# Patient Record
Sex: MALE | Race: ASIAN | NOT HISPANIC OR LATINO | Employment: FULL TIME | URBAN - METROPOLITAN AREA
[De-identification: names, ages, dates, MRNs, and addresses within clinical notes are randomized per-mention and may not be internally consistent; named-entity substitution may affect disease eponyms.]

---

## 2017-06-21 ENCOUNTER — OFFICE VISIT (OUTPATIENT)
Dept: ORTHOPEDIC SURGERY | Facility: CLINIC | Age: 47
End: 2017-06-21

## 2017-06-21 VITALS — BODY MASS INDEX: 33.01 KG/M2 | TEMPERATURE: 98.6 F | WEIGHT: 217.8 LBS | HEIGHT: 68 IN

## 2017-06-21 DIAGNOSIS — S80.02XA CONTUSION OF LEFT KNEE, INITIAL ENCOUNTER: ICD-10-CM

## 2017-06-21 DIAGNOSIS — S59.902A ELBOW INJURY, LEFT, INITIAL ENCOUNTER: Primary | ICD-10-CM

## 2017-06-21 PROCEDURE — 73070 X-RAY EXAM OF ELBOW: CPT | Performed by: ORTHOPAEDIC SURGERY

## 2017-06-21 PROCEDURE — 99204 OFFICE O/P NEW MOD 45 MIN: CPT | Performed by: ORTHOPAEDIC SURGERY

## 2017-06-21 RX ORDER — CEFAZOLIN SODIUM 2 G/100ML
2 INJECTION, SOLUTION INTRAVENOUS ONCE
Status: CANCELLED | OUTPATIENT
Start: 2017-06-21 | End: 2017-06-21

## 2017-06-21 RX ORDER — SODIUM CHLORIDE 0.9 % (FLUSH) 0.9 %
1-10 SYRINGE (ML) INJECTION AS NEEDED
Status: CANCELLED | OUTPATIENT
Start: 2017-06-21

## 2017-06-21 RX ORDER — OXYCODONE HYDROCHLORIDE AND ACETAMINOPHEN 5; 325 MG/1; MG/1
TABLET ORAL
Refills: 0 | COMMUNITY
Start: 2017-06-18 | End: 2017-06-23 | Stop reason: SDUPTHER

## 2017-06-21 NOTE — PROGRESS NOTES
"  Patient: Roni Villaseñor    YOB: 1970    Medical Record Number: [id number]    Chief Complaints:  Left elbow and knee injuries.    History of Present Illness:     47 y.o. male patient who presents for evaluation of both his left elbow and knee.  He was in [place] attending a  when he sustained his injury on .  He apparently jumped 6 feet [employer] elevator and slipped, landing awkwardly on his left side.  He struck the anterior aspect of his left knee and landed awkwardly on his outstretched left upper extremity.  He noticed immediate pain and deformity of the elbow.  He was able to walk but he did have some bruising and swelling over the front of the left knee as well.  He was seen in an emergency room in [place] and underwent a reduction of an reported elbow fracture dislocation.  He tells me that the elbow \"still feels loose\".  He describes mild to moderate constant pain in the elbow, primarily over the lateral side.  Denies any pain in the wrist or shoulder.  He tells me that he is able to move everything normally in his hand.  He describes the knee pain as mild, constant, and aching.  He has been able to walk without difficulty.  His biggest complaint with regards to the knee is the swelling.  Denies any other injuries or complaints.  He has active and independent.  He works in IT.  He is left-hand dominant.    Allergies: No Known Allergies    Home Medications:      Current Outpatient Prescriptions:   •  oxyCODONE-acetaminophen (PERCOCET) 5-325 MG per tablet, TK 1 T PO Q 6 H PRN P, Disp: , Rfl: 0    No past medical history on file.    No past surgical history on file.    Social History     Occupational History   • Not on file.     Social History Main Topics   • Smoking status: Former Smoker   • Smokeless tobacco: Not on file   • Alcohol use Defer   • Drug use: Not on file   • Sexual activity: Not on file      Social History     Social History Narrative   • No narrative on file       No " "family history on file.    Review of Systems:      Constitutional: Denies fever, shaking or chills   Eyes: Denies change in visual acuity   HEENT: Denies nasal congestion or sore throat   Respiratory: Denies cough or shortness of breath   Cardiovascular: Denies chest pain or edema  Endocrine: Denies tremors, palpitations, intolerance of heat or cold, polyuria, polydipsia.  GI: Denies abdominal pain, nausea, vomiting, bloody stools or diarrhea  : Denies frequency, urgency, incontinence, retention, or nocturia.  Musculoskeletal: Denies numbness tingling or loss of motor function except as above  Integument: Denies rash, lesion or ulceration   Neurologic: Denies headache or focal weakness, deficits  Heme: Denies epistaxis, spontaneous or excessive bleeding, epistaxis, hematuria, melena, fatigue, enlarged or tender lymph nodes.      All other pertinent positives and negatives as noted above in HPI.      Physical Exam: 47 y.o. male    Vitals:    06/21/17 0849   Temp: 98.6 °F (37 °C)   TempSrc: Temporal Artery    Weight: 217 lb 12.8 oz (98.8 kg)   Height: 68\" (172.7 cm)       General:  Patient is awake and alert.  Appears in no acute distress or discomfort.  Oriented to person, place, and time.    Psych:  Affect and demeanor are appropriate.    Eyes:  Conjunctiva and sclera appear grossly normal.  Eyes track well and EOM seem to be intact.    Ears:  No gross abnormalities.  Hearing adequate for the exam.    Cardiovascular:  Regular rate and rhythm.    Lungs:  Good chest expansion.  Breathing unlabored.    Lymph:  No palpable masses or adenopathy in the left upper extremity    Extremities:  The left upper extremity is examined.  He had a splint in place across the elbow which was not entirely removed.  I partially removed this to examine his skin.  He has edema and ecchymosis about the elbow but no skin breakdown.  Moderate tenderness laterally.  I cannot range his elbow due to discomfort nor could I range his forearm.  " With any attempted movement of the elbow, he does complain of subjective instability.  He can flex and extend the wrist and fingers with good strength and only mild discomfort.  There is no significant tenderness at the wrist.  He has intact sensation throughout the hand.  The radial pulse is palpable.  He has good cap refill and good skin turgor.    The left knee was also examined.  There is a large ecchymotic area over the anterior aspect.  He has a moderate bursal effusion.  No bony tenderness or palpable defects.  His extensor mechanism is intact and he has good strength.  He is able to perform a single leg squat test with only mild discomfort.  No knee instability evident on exam.  Good strength in his ankle and toes.  Intact sensation.  Brisk cap refill.         Radiology:   Multiple outside x-rays brought in by the patient are available for review including pre-and post reduction films of the left elbow as well as AP and lateral views of the left knee.  I repeated AP and lateral views of the left elbow today for comparison purposes.  The elbow x-rays show a terrible triad type injury with a fracture dislocation of the elbow.  He has a coronoid process fracture, a very comminuted radial head fracture and the elbow dislocated.  There is a small medial avulsion fracture as well.  A CT scan of the elbow is reviewed along with the report and this confirms the findings on x-ray.    Assessment/Plan:  1.  Left elbow terrible triad type injury  2.  Left knee contusion    We discussed options.  I explained to him the prognosis and natural history of this condition.  I explained that this is a very difficult problem and will certainly require surgical intervention.  I told him that without surgery, the risk of recurrent instability is virtually certain.  The risks, benefits, and alternatives to a radial head arthroplasty, lateral collateral ligament repair and possible fixation of the coronoid process fracture were  discussed with him in detail.  We had a lengthy conversation about the surgery, how it is done, and all the associated risks.  We talked about the risk of infection, wound healing problems, heterotopic ossification, recurrent instability, postoperative arthrofibrosis, problems related to the prosthesis including loosening or instability.  I explained to him that in my experience, heterotopic ossification, recurrent instability, and postoperative arthrofibrosis are the most common complications that I myself have seen.  The other risk inherent to the surgery include damage to nearby neurovascular structures, particularly the posterior interosseous nerve.  I explained to him the potential for damage to the structure and resultant wrist drop.  Talked about injury to other nerve nearby neurovascular structures potentially resulting in permanent neurologic dysfunction, vascular disruption and potential need for further surgery.  We talked about RSD, DVT, PE, positioning related neuropraxia and anesthesia related complications.  He has consented to proceed with surgery.  I'm going to leave him in the splint for now.  We will look at getting him set up for the surgery later this week or early next week, at the latest.    With regards to the knee, that should heal with expectant management.  We talked about relative rest, icing as needed and compressive wraps.      06/21/2017    CC to No Known Provider

## 2017-06-23 ENCOUNTER — TELEPHONE (OUTPATIENT)
Dept: ORTHOPEDIC SURGERY | Facility: CLINIC | Age: 47
End: 2017-06-23

## 2017-06-23 ENCOUNTER — APPOINTMENT (OUTPATIENT)
Dept: PREADMISSION TESTING | Facility: HOSPITAL | Age: 47
End: 2017-06-23

## 2017-06-23 VITALS
OXYGEN SATURATION: 95 % | WEIGHT: 215 LBS | RESPIRATION RATE: 20 BRPM | BODY MASS INDEX: 32.58 KG/M2 | TEMPERATURE: 98.4 F | SYSTOLIC BLOOD PRESSURE: 165 MMHG | HEIGHT: 68 IN | DIASTOLIC BLOOD PRESSURE: 96 MMHG | HEART RATE: 109 BPM

## 2017-06-23 LAB
ANION GAP SERPL CALCULATED.3IONS-SCNC: 15.8 MMOL/L
BUN BLD-MCNC: 12 MG/DL (ref 6–20)
BUN/CREAT SERPL: 13.3 (ref 7–25)
CALCIUM SPEC-SCNC: 9.5 MG/DL (ref 8.6–10.5)
CHLORIDE SERPL-SCNC: 99 MMOL/L (ref 98–107)
CO2 SERPL-SCNC: 25.2 MMOL/L (ref 22–29)
CREAT BLD-MCNC: 0.9 MG/DL (ref 0.76–1.27)
DEPRECATED RDW RBC AUTO: 41.5 FL (ref 37–54)
ERYTHROCYTE [DISTWIDTH] IN BLOOD BY AUTOMATED COUNT: 12.7 % (ref 11.5–14.5)
GFR SERPL CREATININE-BSD FRML MDRD: 110 ML/MIN/1.73
GFR SERPL CREATININE-BSD FRML MDRD: 91 ML/MIN/1.73
GLUCOSE BLD-MCNC: 316 MG/DL (ref 65–99)
HCT VFR BLD AUTO: 37.8 % (ref 40.4–52.2)
HGB BLD-MCNC: 13.1 G/DL (ref 13.7–17.6)
MCH RBC QN AUTO: 31.5 PG (ref 27–32.7)
MCHC RBC AUTO-ENTMCNC: 34.7 G/DL (ref 32.6–36.4)
MCV RBC AUTO: 90.9 FL (ref 79.8–96.2)
PLATELET # BLD AUTO: 274 10*3/MM3 (ref 140–500)
PMV BLD AUTO: 9.7 FL (ref 6–12)
POTASSIUM BLD-SCNC: 4 MMOL/L (ref 3.5–5.2)
RBC # BLD AUTO: 4.16 10*6/MM3 (ref 4.6–6)
SODIUM BLD-SCNC: 140 MMOL/L (ref 136–145)
WBC NRBC COR # BLD: 12.95 10*3/MM3 (ref 4.5–10.7)

## 2017-06-23 PROCEDURE — 93005 ELECTROCARDIOGRAM TRACING: CPT

## 2017-06-23 PROCEDURE — 85027 COMPLETE CBC AUTOMATED: CPT | Performed by: ORTHOPAEDIC SURGERY

## 2017-06-23 PROCEDURE — 36415 COLL VENOUS BLD VENIPUNCTURE: CPT

## 2017-06-23 PROCEDURE — 80048 BASIC METABOLIC PNL TOTAL CA: CPT | Performed by: ORTHOPAEDIC SURGERY

## 2017-06-23 PROCEDURE — 93010 ELECTROCARDIOGRAM REPORT: CPT | Performed by: INTERNAL MEDICINE

## 2017-06-23 RX ORDER — OXYCODONE HYDROCHLORIDE AND ACETAMINOPHEN 5; 325 MG/1; MG/1
1 TABLET ORAL EVERY 6 HOURS PRN
COMMUNITY
End: 2017-06-29 | Stop reason: HOSPADM

## 2017-06-23 NOTE — TELEPHONE ENCOUNTER
In review of his preadmission testing.  Patient was found have an abnormal EKG.  Also had a blood sugar of 316.  He does not have a primary care physician and states that he has not been to a physician in several years.  Denies any history family history of diabetes or any cardiac disease.  Does admit that he is sedentary with his activity.  His blood sugar was not a fasting specimen he had a coffee with sugar although 316 is still  high.  I have asked him if he has a family number that has a physician that we can get him into for clearance prior to surgery.  However he is not aware of 1 that he could see.  I discussed with Dr. Davila and will make arrangements for him to be seen by the cardiologist.  Patient understands that his surgery may have to be postponed

## 2017-06-23 NOTE — TELEPHONE ENCOUNTER
----- Message from Dmitry Davila MD sent at 6/23/2017  5:24 PM EDT -----  Does this need further work up?  Any cardiac history?    ----- Message -----     From: Interface, Ekg Results In     Sent: 6/23/2017   4:47 PM       To: Dmitry Davila MD

## 2017-06-23 NOTE — DISCHARGE INSTRUCTIONS
Take the following medications the morning of surgery with a small sip of water:    none    General Instructions:  • Do not eat solid food after midnight the night before surgery.  • You may drink clear liquids day of surgery but must stop at least one hour before your hospital arrival time.  • It is beneficial for you to have a clear drink that contains carbohydrates the day of surgery.  We suggest a 20 ounce bottle of Gatorade or Powerade for non-diabetic patients or a 20 ounce bottle of G2 or Powerade Zero for diabetic patients. (Pediatric patients, are not advised to drink a 20 ounce carbohydrate drink)    Clear liquids are liquids you can see through.  Nothing red in color.     Plain water                               Sports drinks  Sodas                                   Gelatin (Jell-O)  Fruit juices without pulp such as white grape juice and apple juice  Popsicles that contain no fruit or yogurt  Tea or coffee (no cream or milk added)  Gatorade / Powerade  G2 / Powerade Zero    • Infants may have breast milk up to four hours before surgery.  • Infants drinking formula may drink formula up to six hours before surgery.   • Patients who avoid smoking, chewing tobacco and alcohol for 4 weeks prior to surgery have a reduced risk of post-operative complications.  Quit smoking as many days before surgery as you can.  • Do not smoke, use chewing tobacco or drink alcohol the day of surgery.   • If applicable bring your C-PAP/ BI-PAP machine.  • Bring any papers given to you in the doctor’s office.  • Wear clean comfortable clothes and socks.  • Do not wear contact lenses or make-up.  Bring a case for your glasses.   • Bring crutches or walker if applicable.  • Leave all other valuables and jewelry at home.  • The Pre-Admission Testing nurse will instruct you to bring medications if unable to obtain an accurate list in Pre-Admission Testing.        If you were given a blood bank ID arm band remember to bring it with  you the day of surgery.    Preventing a Surgical Site Infection:  • For 2 to 3 days before surgery, avoid shaving with a razor because the razor can irritate skin and make it easier to develop an infection.  • The night prior to surgery sleep in a clean bed with clean clothing.  Do not allow pets to sleep with you.  • Shower on the morning of surgery using a fresh bar of anti-bacterial soap (such as Dial) and clean washcloth.  Dry with a clean towel and dress in clean clothing.  • Ask your surgeon if you will be receiving antibiotics prior to surgery.  • Make sure you, your family, and all healthcare providers clean their hands with soap and water or an alcohol based hand  before caring for you or your wound.    Day of surgery:6/27/17  Hospital to call with time of arrival  Upon arrival, a Pre-op nurse and Anesthesiologist will review your health history, obtain vital signs, and answer questions you may have.  The only belongings needed at this time will be your home medications and if applicable your C-PAP/BI-PAP machine.  If you are staying overnight your family can leave the rest of your belongings in the car and bring them to your room later.  A Pre-op nurse will start an IV and you may receive medication in preparation for surgery, including something to help you relax.  Your family will be able to see you in the Pre-op area.  While you are in surgery your family should notify the waiting room  if they leave the waiting room area and provide a contact phone number.    Please be aware that surgery does come with discomfort.  We want to make every effort to control your discomfort so please discuss any uncontrolled symptoms with your nurse.   Your doctor will most likely have prescribed pain medications.      If you are going home after surgery you will receive individualized written care instructions before being discharged.  A responsible adult must drive you to and from the hospital on the  day of your surgery and stay with you for 24 hours.    If you are staying overnight following surgery, you will be transported to your hospital room following the recovery period.  [facility] has all private rooms.    If you have any questions please call Pre-Admission Testing at [phone].  Deductibles and co-payments are collected on the day of service. Please be prepared to pay the required co-pay, deductible or deposit on the day of service as defined by your plan.

## 2017-06-26 ENCOUNTER — PREP FOR SURGERY (OUTPATIENT)
Dept: ORTHOPEDIC SURGERY | Facility: CLINIC | Age: 47
End: 2017-06-26

## 2017-06-26 ENCOUNTER — TELEPHONE (OUTPATIENT)
Dept: ORTHOPEDIC SURGERY | Facility: CLINIC | Age: 47
End: 2017-06-26

## 2017-06-26 DIAGNOSIS — R94.31 ABNORMAL FINDING ON EKG: Primary | ICD-10-CM

## 2017-06-26 DIAGNOSIS — R73.9 ELEVATED BLOOD SUGAR LEVEL: ICD-10-CM

## 2017-06-26 DIAGNOSIS — S59.902A ELBOW INJURY, LEFT, INITIAL ENCOUNTER: Primary | ICD-10-CM

## 2017-06-26 NOTE — TELEPHONE ENCOUNTER
Have notified the patient regarding his appt with [facility] for tomorrow for cardiac clearance.  Will tentatively reschedule his surgery for Thursday.  Patient has also been scheduled for fasting glucose and HgA1c to be drawn the morning of surgery, per [facility].

## 2017-06-27 ENCOUNTER — OFFICE VISIT (OUTPATIENT)
Dept: CARDIOLOGY | Facility: CLINIC | Age: 47
End: 2017-06-27

## 2017-06-27 VITALS
HEIGHT: 68 IN | BODY MASS INDEX: 31.74 KG/M2 | WEIGHT: 209.4 LBS | DIASTOLIC BLOOD PRESSURE: 100 MMHG | HEART RATE: 105 BPM | SYSTOLIC BLOOD PRESSURE: 150 MMHG

## 2017-06-27 DIAGNOSIS — F17.210 CIGARETTE NICOTINE DEPENDENCE WITHOUT COMPLICATION: ICD-10-CM

## 2017-06-27 DIAGNOSIS — R79.89 ABNORMAL CBC: ICD-10-CM

## 2017-06-27 DIAGNOSIS — R01.1 HEART MURMUR: ICD-10-CM

## 2017-06-27 DIAGNOSIS — R94.31 ABNORMAL EKG: ICD-10-CM

## 2017-06-27 DIAGNOSIS — R03.0 TRANSIENT ELEVATED BLOOD PRESSURE: ICD-10-CM

## 2017-06-27 DIAGNOSIS — Z01.810 ENCOUNTER FOR PRE-OPERATIVE CARDIOVASCULAR CLEARANCE: Primary | ICD-10-CM

## 2017-06-27 DIAGNOSIS — Z78.9 ELECTRONIC CIGARETTE USE: ICD-10-CM

## 2017-06-27 DIAGNOSIS — R73.09 ABNORMAL BLOOD SUGAR: ICD-10-CM

## 2017-06-27 DIAGNOSIS — R06.09 DYSPNEA ON EXERTION: ICD-10-CM

## 2017-06-27 PROCEDURE — 93000 ELECTROCARDIOGRAM COMPLETE: CPT | Performed by: NURSE PRACTITIONER

## 2017-06-27 PROCEDURE — 99244 OFF/OP CNSLTJ NEW/EST MOD 40: CPT | Performed by: NURSE PRACTITIONER

## 2017-06-27 NOTE — PROGRESS NOTES
Date of Office Visit: 2017  Encounter Provider: ANTONELLA Blackmon  Place of Service: [facility] CARDIOLOGY  Patient Name: Roni Villaseñor  :1970    Chief Complaint   Patient presents with   • Pre-op Exam   :     HPI: Roni Villaseñor is a 47 y.o. male who presents today for perioperative cardiac risk assessment.  He was referred by Dr. Dmitry Davila.  The patient had an injury on 2017 where he fell and landed on his left side. He has an elbow injury, which is wrapped in an Ace bandage and he is currently in a sling. He was scheduled to undergo radial head arthroplasty, lateral collateral ligament repair, and possible fixation of coronoid process yesterday. The surgery was cancelled after he was noted to have an abnormal EKG on preadmission testing with T-wave inversions in the inferior lead. This patient states that he has never had an EKG completed before. He does not have a family physician and does not report any past medical history. His blood sugar was also noted to be elevated at 316 and had some abnormalities on his CBC. He denies any history of diabetes, hypertension or hyperlipidemia. He states that he has not been checked for any of those diagnoses.    I asked the patient if he felt that he was active and he replied “no.” He does feel that he could walk up a couple of flights of steps without any problem. He does experience dyspnea with exertion when rushing or running. He denies chest pain, paroxysmal nocturnal dyspnea, orthopnea, cough, wheezing, edema, dizziness or syncope. He denies any palpitations. His heart rate is fast today and he is aware of this.      Past Medical History:   Diagnosis Date   • Arm bruise     scab on left knee   • Fracture of elbow     left       Past Surgical History:   Procedure Laterality Date   • NO PAST SURGERIES         Social History     Social History   • Marital status: Single     Spouse name: N/A   • Number of children: N/A   •  Years of education: N/A     Occupational History   • Not on file.     Social History Main Topics   • Smoking status: Current Every Day Smoker     Packs/day: 0.50     Years: 10.00     Types: Cigarettes, Electronic Cigarette   • Smokeless tobacco: Never Used      Comment: electronic cigarette nicotine 2 amps last 2 weeks   • Alcohol use Yes      Comment: rarely/  Daily caffeine use   • Drug use: Yes     Special: Marijuana      Comment: not regular basis   • Sexual activity: Not on file     Other Topics Concern   • Not on file     Social History Narrative       Family History   Problem Relation Age of Onset   • Hypertension Father    • Hypertension Paternal Uncle    • Malig Hyperthermia Neg Hx        Review of Systems   Constitution: Positive for malaise/fatigue. Negative for chills, diaphoresis, fever, night sweats, weight gain and weight loss.   HENT: Negative for hearing loss, nosebleeds, sore throat and tinnitus.    Eyes: Negative for blurred vision, double vision, pain and visual disturbance.   Cardiovascular: Positive for dyspnea on exertion. Negative for chest pain, claudication, cyanosis, irregular heartbeat, leg swelling, near-syncope, orthopnea, palpitations, paroxysmal nocturnal dyspnea and syncope.   Respiratory: Negative for cough, hemoptysis, shortness of breath, snoring and wheezing.    Endocrine: Negative for cold intolerance, heat intolerance and polyuria.   Hematologic/Lymphatic: Negative for bleeding problem. Does not bruise/bleed easily.   Skin: Negative for color change, dry skin, flushing and itching.   Musculoskeletal: Negative for falls, joint pain, joint swelling, muscle cramps, muscle weakness and myalgias.   Gastrointestinal: Negative for abdominal pain, constipation, heartburn, melena, nausea and vomiting.   Genitourinary: Negative for dysuria and hematuria.   Neurological: Negative for excessive daytime sleepiness, dizziness, light-headedness, loss of balance, numbness, paresthesias,  "seizures and vertigo.   Psychiatric/Behavioral: Negative for altered mental status, depression, memory loss and substance abuse. The patient does not have insomnia and is not nervous/anxious.    Allergic/Immunologic: Negative for environmental allergies.       No Known Allergies      Current Outpatient Prescriptions:   •  oxyCODONE-acetaminophen (PERCOCET) 5-325 MG per tablet, Take 1 tablet by mouth Every 6 (Six) Hours As Needed., Disp: , Rfl:   No current facility-administered medications for this visit.      Objective:     Vitals:    06/27/17 0906   BP: 150/100   BP Location: Right arm   Pulse: 105   Weight: 209 lb 6.4 oz (95 kg)   Height: 68\" (172.7 cm)     Body mass index is 31.84 kg/(m^2).    PHYSICAL EXAM:    Vitals Reviewed.   General Appearance: No acute distress, well developed and well nourished. Obese.  Eyes: Conjunctiva and lids: No erythema, swelling, or discharge. Sclera non-icteric.   HENT: Atraumatic, normocephalic. External eyes, ears, and nose normal. No hearing loss noted. Mucous membranes normal. Lips not cyanotic. Neck supple with no tenderness.  Respiratory: No signs of respiratory distress. Respiration rhythm and depth normal.   Clear to auscultation. No rales, crackles, rhonchi, or wheezing auscultated.   Cardiovascular:  Jugular Venous Pressure: Normal  Heart Rate and Rhythm: Tachycardic and normal rhythm.  Heart Sounds: Normal S1 and S2. No S3 or S4 noted.  Murmurs: Left lower sternal border grade 1/6 murmur noted. No rubs, thrills, or gallops.   Arterial Pulses: Carotid pulses normal. No carotid bruit noted. Posterior tibialis and dorsalis pedis pulses normal.   Lower Extremities: No edema noted.  Gastrointestinal:  Abdomen soft, non-distended, non-tender. Normal bowel sounds. No hepatomegaly.   Musculoskeletal: Normal movement of extremities.  Left hand swollen.  Left arm wrapped in Ace bandage and in a sling.  Skin and Nails: General appearance normal. No pallor, cyanosis, diaphoresis. " Skin temperature normal. No clubbing of fingernails.   Psychiatric: Patient alert and oriented to person, place, and time. Speech and behavior appropriate. Normal mood and affect.       ECG 12 Lead  Date/Time: 6/27/2017 9:03 AM  Performed by: WOODY EMERSON  Authorized by: WOODY EMERSON   Comparison: compared with previous ECG from 6/23/2017  Comparison to previous ECG: Sinus tachycardia, T wave inversions in lead III and aVF, borderline Q waves in the inferior leads  Rhythm: sinus tachycardia  Rate: tachycardic  BPM: 105  Conduction: conduction normal  ST Segments: ST segments normal  T Waves: T waves normal  QRS axis: normal  Q waves: III and aVF  Clinical impression: abnormal ECG  Comments: Artifact present because patient is in left arm sling  T wave inversions in the inferior leads do not appear to be present              Assessment:       Diagnosis Plan   1. Encounter for pre-operative cardiovascular clearance  Stress Test With Myocardial Perfusion One Day    ECG 12 Lead   2. Abnormal EKG  Stress Test With Myocardial Perfusion One Day    ECG 12 Lead   3. Dyspnea on exertion  Stress Test With Myocardial Perfusion One Day   4. Heart murmur     5. Transient elevated blood pressure  Stress Test With Myocardial Perfusion One Day    ECG 12 Lead   6. Abnormal blood sugar     7. Abnormal CBC     8. Cigarette nicotine dependence without complication  Stress Test With Myocardial Perfusion One Day   9. Electronic cigarette use            Plan:       1. Perioperative Cardiac Risk Assessment: Mr. Kraft is scheduled to undergo left arm surgery on Thursday by Dr. Dmitry Davila. He was noted to have an abnormal EKG in preadmission testing with borderline Q waves and T wave inversions in the inferior leads. There is artifact present on today’s EKG and the T wave inversions do not appear to be present. He does experience some shortness of breath with exertion. He is unsure about his risk factors of coronary artery disease  because he does not see a primary care physician. His blood sugar and blood pressure were elevated in preadmission testing. Blood pressure remains elevated today. He is a cigarette smoker. I have recommended that he have a Lexiscan Myoview stress test and 2D echocardiogram to be completed for further evaluation.     2. Dyspnea on exertion. The patient states that he may just be out of shape. We will further evaluate with cardiac testing.     3. Heart murmur. The patient was noted to have a mild heart murmur present today. This may be a flow murmur because of his elevated blood pressure. Further evaluation with a 2D echocardiogram.     4. Transient elevated blood pressure. I have informed the patient that his blood pressure is elevated today. I have recommended good blood pressure control and weight loss. I have provided him a phone number to seek a [facility] primary care physician. His EKG shows possible left ventricular hypertrophy.     5. Abnormal blood sugar. I have highly recommended that he follow up with a primary care physician for evaluation of possible diabetes.  His blood sugar was elevated at 316.  He said he was fasting from food but had had coffee with milk earlier that morning.    6. Nicotine dependence. He uses an electronic cigarette and also smokes regular cigarettes. I have highly advised that he abstain from cigarette smoking.    7.   Abnormal CBC: His white blood cell count was elevated at 12.95, RBC 4.16 low, hemoglobin 13.1 low, and hematocrit           37.8 low.  These labs were completed in preadmission testing.  I will defer to his surgeon who ordered the labs and have       also recommended to the patient that he needs to obtain a primary care physician.    As always, it has been a pleasure to participate in your patient's care.  Thank you for the referral.      Sincerely,         ANTONELLA Anderson

## 2017-06-28 ENCOUNTER — TELEPHONE (OUTPATIENT)
Dept: CARDIOLOGY | Facility: CLINIC | Age: 47
End: 2017-06-28

## 2017-06-28 ENCOUNTER — HOSPITAL ENCOUNTER (OUTPATIENT)
Dept: CARDIOLOGY | Facility: HOSPITAL | Age: 47
Discharge: HOME | End: 2017-06-28
Admitting: NURSE PRACTITIONER

## 2017-06-28 VITALS
BODY MASS INDEX: 31.67 KG/M2 | WEIGHT: 209 LBS | SYSTOLIC BLOOD PRESSURE: 162 MMHG | HEIGHT: 68 IN | HEART RATE: 99 BPM | DIASTOLIC BLOOD PRESSURE: 98 MMHG

## 2017-06-28 DIAGNOSIS — R94.31 ABNORMAL EKG: ICD-10-CM

## 2017-06-28 DIAGNOSIS — F17.210 CIGARETTE NICOTINE DEPENDENCE WITHOUT COMPLICATION: ICD-10-CM

## 2017-06-28 DIAGNOSIS — R03.0 TRANSIENT ELEVATED BLOOD PRESSURE: ICD-10-CM

## 2017-06-28 DIAGNOSIS — Z01.810 ENCOUNTER FOR PRE-OPERATIVE CARDIOVASCULAR CLEARANCE: ICD-10-CM

## 2017-06-28 DIAGNOSIS — R06.09 DYSPNEA ON EXERTION: ICD-10-CM

## 2017-06-28 LAB
ASCENDING AORTA: 2.7 CM
BH CV ECHO MEAS - ACS: 1.8 CM
BH CV ECHO MEAS - AO MAX PG (FULL): 9.2 MMHG
BH CV ECHO MEAS - AO MAX PG: 12.4 MMHG
BH CV ECHO MEAS - AO MEAN PG (FULL): 5 MMHG
BH CV ECHO MEAS - AO MEAN PG: 7 MMHG
BH CV ECHO MEAS - AO ROOT AREA (BSA CORRECTED): 1.5
BH CV ECHO MEAS - AO ROOT AREA: 8 CM^2
BH CV ECHO MEAS - AO ROOT DIAM: 3.2 CM
BH CV ECHO MEAS - AO V2 MAX: 176 CM/SEC
BH CV ECHO MEAS - AO V2 MEAN: 126 CM/SEC
BH CV ECHO MEAS - AO V2 VTI: 27.1 CM
BH CV ECHO MEAS - AVA(I,A): 1.5 CM^2
BH CV ECHO MEAS - AVA(I,D): 1.5 CM^2
BH CV ECHO MEAS - AVA(V,A): 1.4 CM^2
BH CV ECHO MEAS - AVA(V,D): 1.4 CM^2
BH CV ECHO MEAS - BSA(HAYCOCK): 2.2 M^2
BH CV ECHO MEAS - BSA: 2.1 M^2
BH CV ECHO MEAS - BZI_BMI: 31.8 KILOGRAMS/M^2
BH CV ECHO MEAS - BZI_METRIC_HEIGHT: 172.7 CM
BH CV ECHO MEAS - BZI_METRIC_WEIGHT: 94.8 KG
BH CV ECHO MEAS - CONTRAST EF (2CH): 60.6 ML/M^2
BH CV ECHO MEAS - CONTRAST EF 4CH: 68 ML/M^2
BH CV ECHO MEAS - EDV(CUBED): 96.1 ML
BH CV ECHO MEAS - EDV(MOD-SP2): 104 ML
BH CV ECHO MEAS - EDV(MOD-SP4): 97 ML
BH CV ECHO MEAS - EDV(TEICH): 96.3 ML
BH CV ECHO MEAS - EF(CUBED): 68.4 %
BH CV ECHO MEAS - EF(MOD-SP2): 60.6 %
BH CV ECHO MEAS - EF(MOD-SP4): 68 %
BH CV ECHO MEAS - EF(TEICH): 60 %
BH CV ECHO MEAS - ESV(CUBED): 30.4 ML
BH CV ECHO MEAS - ESV(MOD-SP2): 41 ML
BH CV ECHO MEAS - ESV(MOD-SP4): 31 ML
BH CV ECHO MEAS - ESV(TEICH): 38.5 ML
BH CV ECHO MEAS - FS: 31.9 %
BH CV ECHO MEAS - IVS/LVPW: 0.9
BH CV ECHO MEAS - IVSD: 1 CM
BH CV ECHO MEAS - LAT PEAK E' VEL: 10 CM/SEC
BH CV ECHO MEAS - LV DIASTOLIC VOL/BSA (35-75): 46.6 ML/M^2
BH CV ECHO MEAS - LV MASS(C)D: 176.5 GRAMS
BH CV ECHO MEAS - LV MASS(C)DI: 84.8 GRAMS/M^2
BH CV ECHO MEAS - LV MAX PG: 3.2 MMHG
BH CV ECHO MEAS - LV MEAN PG: 2 MMHG
BH CV ECHO MEAS - LV SYSTOLIC VOL/BSA (12-30): 14.9 ML/M^2
BH CV ECHO MEAS - LV V1 MAX: 88.8 CM/SEC
BH CV ECHO MEAS - LV V1 MEAN: 60.3 CM/SEC
BH CV ECHO MEAS - LV V1 VTI: 14.4 CM
BH CV ECHO MEAS - LVIDD: 4.6 CM
BH CV ECHO MEAS - LVIDS: 3.1 CM
BH CV ECHO MEAS - LVLD AP2: 8.5 CM
BH CV ECHO MEAS - LVLD AP4: 8.2 CM
BH CV ECHO MEAS - LVLS AP2: 7.8 CM
BH CV ECHO MEAS - LVLS AP4: 7 CM
BH CV ECHO MEAS - LVOT AREA (M): 2.8 CM^2
BH CV ECHO MEAS - LVOT AREA: 2.8 CM^2
BH CV ECHO MEAS - LVOT DIAM: 1.9 CM
BH CV ECHO MEAS - LVPWD: 1.1 CM
BH CV ECHO MEAS - MED PEAK E' VEL: 16 CM/SEC
BH CV ECHO MEAS - MV A DUR: 0.09 SEC
BH CV ECHO MEAS - MV A MAX VEL: 86.4 CM/SEC
BH CV ECHO MEAS - MV DEC SLOPE: 482 CM/SEC^2
BH CV ECHO MEAS - MV DEC TIME: 0.22 SEC
BH CV ECHO MEAS - MV E MAX VEL: 107 CM/SEC
BH CV ECHO MEAS - MV E/A: 1.2
BH CV ECHO MEAS - MV MAX PG: 5.2 MMHG
BH CV ECHO MEAS - MV MEAN PG: 2 MMHG
BH CV ECHO MEAS - MV P1/2T MAX VEL: 108 CM/SEC
BH CV ECHO MEAS - MV P1/2T: 65.6 MSEC
BH CV ECHO MEAS - MV V2 MAX: 114 CM/SEC
BH CV ECHO MEAS - MV V2 MEAN: 70 CM/SEC
BH CV ECHO MEAS - MV V2 VTI: 23.9 CM
BH CV ECHO MEAS - MVA P1/2T LCG: 2 CM^2
BH CV ECHO MEAS - MVA(P1/2T): 3.4 CM^2
BH CV ECHO MEAS - MVA(VTI): 1.7 CM^2
BH CV ECHO MEAS - PA MAX PG (FULL): 2.5 MMHG
BH CV ECHO MEAS - PA MAX PG: 7 MMHG
BH CV ECHO MEAS - PA V2 MAX: 132 CM/SEC
BH CV ECHO MEAS - PULM A REVS DUR: 0.07 SEC
BH CV ECHO MEAS - PULM A REVS VEL: 20.8 CM/SEC
BH CV ECHO MEAS - PULM DIAS VEL: 42 CM/SEC
BH CV ECHO MEAS - PULM S/D: 1
BH CV ECHO MEAS - PULM SYS VEL: 43.5 CM/SEC
BH CV ECHO MEAS - PVA(V,A): 2 CM^2
BH CV ECHO MEAS - PVA(V,D): 2 CM^2
BH CV ECHO MEAS - QP/QS: 1
BH CV ECHO MEAS - RAP SYSTOLE: 3 MMHG
BH CV ECHO MEAS - RV MAX PG: 4.5 MMHG
BH CV ECHO MEAS - RV MEAN PG: 3 MMHG
BH CV ECHO MEAS - RV V1 MAX: 106 CM/SEC
BH CV ECHO MEAS - RV V1 MEAN: 81.8 CM/SEC
BH CV ECHO MEAS - RV V1 VTI: 16.6 CM
BH CV ECHO MEAS - RVOT AREA: 2.5 CM^2
BH CV ECHO MEAS - RVOT DIAM: 1.8 CM
BH CV ECHO MEAS - SI(AO): 104.7 ML/M^2
BH CV ECHO MEAS - SI(CUBED): 31.5 ML/M^2
BH CV ECHO MEAS - SI(LVOT): 19.6 ML/M^2
BH CV ECHO MEAS - SI(MOD-SP2): 30.3 ML/M^2
BH CV ECHO MEAS - SI(MOD-SP4): 31.7 ML/M^2
BH CV ECHO MEAS - SI(TEICH): 27.8 ML/M^2
BH CV ECHO MEAS - SUP REN AO DIAM: 2 CM
BH CV ECHO MEAS - SV(AO): 218 ML
BH CV ECHO MEAS - SV(CUBED): 65.7 ML
BH CV ECHO MEAS - SV(LVOT): 40.8 ML
BH CV ECHO MEAS - SV(MOD-SP2): 63 ML
BH CV ECHO MEAS - SV(MOD-SP4): 66 ML
BH CV ECHO MEAS - SV(RVOT): 42.2 ML
BH CV ECHO MEAS - SV(TEICH): 57.8 ML
BH CV ECHO MEAS - TAPSE (>1.6): 2.1 CM2
BH CV NUCLEAR PRIOR STUDY: 2
BH CV STRESS BP STAGE 1: NORMAL
BH CV STRESS COMMENTS STAGE 1: NORMAL
BH CV STRESS DOSE REGADENOSON STAGE 1: 0.4
BH CV STRESS DURATION MIN STAGE 1: 0
BH CV STRESS DURATION SEC STAGE 1: 15
BH CV STRESS HR STAGE 1: 126
BH CV STRESS PROTOCOL 1: NORMAL
BH CV STRESS RECOVERY BP: NORMAL MMHG
BH CV STRESS RECOVERY HR: 108 BPM
BH CV STRESS STAGE 1: 1
BH CV XLRA - RV BASE: 2.5 CM
BH CV XLRA - TDI S': 1.6 CM/SEC
E/E' RATIO: 13
LEFT ATRIUM VOLUME INDEX: 13 ML/M2
LV EF NUC BP: 52 %
MAXIMAL PREDICTED HEART RATE: 174 BPM
PERCENT MAX PREDICTED HR: 72.41 %
SINUS: 2.4 CM
STJ: 2.1 CM
STRESS BASELINE BP: NORMAL MMHG
STRESS BASELINE HR: 93 BPM
STRESS PERCENT HR: 85 %
STRESS POST EXERCISE DUR SEC: 15 SEC
STRESS POST PEAK BP: NORMAL MMHG
STRESS POST PEAK HR: 126 BPM
STRESS TARGET HR: 148 BPM

## 2017-06-28 PROCEDURE — C8929 TTE W OR WO FOL WCON,DOPPLER: HCPCS

## 2017-06-28 PROCEDURE — 93018 CV STRESS TEST I&R ONLY: CPT | Performed by: INTERNAL MEDICINE

## 2017-06-28 PROCEDURE — 0 TECHNETIUM TETROFOSMIN KIT: Performed by: NURSE PRACTITIONER

## 2017-06-28 PROCEDURE — 93306 TTE W/DOPPLER COMPLETE: CPT | Performed by: INTERNAL MEDICINE

## 2017-06-28 PROCEDURE — 25010000002 PERFLUTREN (DEFINITY) 8.476 MG IN SODIUM CHLORIDE 10 ML INJECTION: Performed by: NURSE PRACTITIONER

## 2017-06-28 PROCEDURE — 25010000002 REGADENOSON 0.4 MG/5ML SOLUTION: Performed by: NURSE PRACTITIONER

## 2017-06-28 PROCEDURE — 93016 CV STRESS TEST SUPVJ ONLY: CPT | Performed by: INTERNAL MEDICINE

## 2017-06-28 PROCEDURE — A9502 TC99M TETROFOSMIN: HCPCS | Performed by: NURSE PRACTITIONER

## 2017-06-28 PROCEDURE — 78452 HT MUSCLE IMAGE SPECT MULT: CPT

## 2017-06-28 PROCEDURE — 78452 HT MUSCLE IMAGE SPECT MULT: CPT | Performed by: INTERNAL MEDICINE

## 2017-06-28 PROCEDURE — 93017 CV STRESS TEST TRACING ONLY: CPT

## 2017-06-28 RX ADMIN — TETROFOSMIN 1 DOSE: 1.38 INJECTION, POWDER, LYOPHILIZED, FOR SOLUTION INTRAVENOUS at 10:15

## 2017-06-28 RX ADMIN — REGADENOSON 0.4 MG: 0.08 INJECTION, SOLUTION INTRAVENOUS at 10:15

## 2017-06-28 RX ADMIN — PERFLUTREN 1.5 ML: 6.52 INJECTION, SUSPENSION INTRAVENOUS at 09:17

## 2017-06-28 RX ADMIN — TETROFOSMIN 1 DOSE: 1.38 INJECTION, POWDER, LYOPHILIZED, FOR SOLUTION INTRAVENOUS at 09:30

## 2017-06-28 NOTE — TELEPHONE ENCOUNTER
Echocardiogram Results:    · Left ventricular systolic function is normal. Calculated EF = 68%. Estimated EF was in agreement with the calculated EF.   · Normal left ventricular cavity size and wall thickness noted.   · All left ventricular wall segments contract normally.   · Left ventricular diastolic function is normal.  · Trace mitral and tricuspid regurgitation    Nuclear Stress Test Results:    · Myocardial perfusion imaging indicates a medium-sized infarct located in the inferior wall with no significant ischemia noted.  · Left ventricular ejection fraction is normal (Calculated EF = 52%). Proximal inferior wall hypokinesis.  · Impressions are consistent with a low risk study.    I discussed plan of care with Dr. Hernando Elliott. He said patient may proceed with surgery tomorrow. I have notified Dr. Davila's surgery scheduler. Patient has been informed about results.     Patient is considered at acceptable risk for surgery from a cardiovascular standpoint. According to the [facility]'s Revised Cardiac Risk Index, patient is considered low risk (0.9%) of adverse cardiovascular events occurring with moderate risk surgery.

## 2017-06-28 NOTE — TELEPHONE ENCOUNTER
----- Message from ANTONELLA Velazquez sent at 6/27/2017 10:20 AM EDT -----    Follow-up on echocardiogram and Lexiscan 6/28/17  He needs preop for Thursday 6/29/17

## 2017-06-28 NOTE — TELEPHONE ENCOUNTER
Please arrange a follow up appointment with Dr. Hernando Elliott in 4-6 weeks at the main office. Thanks.

## 2017-06-29 ENCOUNTER — APPOINTMENT (OUTPATIENT)
Dept: GENERAL RADIOLOGY | Facility: HOSPITAL | Age: 47
End: 2017-06-29

## 2017-06-29 ENCOUNTER — HOSPITAL ENCOUNTER (OUTPATIENT)
Facility: HOSPITAL | Age: 47
Setting detail: HOSPITAL OUTPATIENT SURGERY
Discharge: HOME | End: 2017-06-29
Attending: ORTHOPAEDIC SURGERY | Admitting: ORTHOPAEDIC SURGERY

## 2017-06-29 ENCOUNTER — ANESTHESIA EVENT (OUTPATIENT)
Dept: PERIOP | Facility: HOSPITAL | Age: 47
End: 2017-06-29

## 2017-06-29 ENCOUNTER — ANESTHESIA (OUTPATIENT)
Dept: PERIOP | Facility: HOSPITAL | Age: 47
End: 2017-06-29

## 2017-06-29 VITALS
OXYGEN SATURATION: 97 % | TEMPERATURE: 98.7 F | WEIGHT: 210 LBS | HEIGHT: 68 IN | BODY MASS INDEX: 31.83 KG/M2 | RESPIRATION RATE: 18 BRPM | DIASTOLIC BLOOD PRESSURE: 96 MMHG | HEART RATE: 98 BPM | SYSTOLIC BLOOD PRESSURE: 161 MMHG

## 2017-06-29 DIAGNOSIS — S59.902A ELBOW INJURY, LEFT, INITIAL ENCOUNTER: ICD-10-CM

## 2017-06-29 DIAGNOSIS — R73.9 ELEVATED BLOOD SUGAR LEVEL: ICD-10-CM

## 2017-06-29 LAB
BACTERIA UR QL AUTO: NORMAL /HPF
BILIRUB UR QL STRIP: NEGATIVE
CLARITY UR: ABNORMAL
COLOR UR: ABNORMAL
GLUCOSE BLDC GLUCOMTR-MCNC: 182 MG/DL (ref 70–130)
GLUCOSE UR STRIP-MCNC: ABNORMAL MG/DL
HBA1C MFR BLD: 9.5 % (ref 4.8–5.6)
HGB UR QL STRIP.AUTO: NEGATIVE
HYALINE CASTS UR QL AUTO: NORMAL /LPF
KETONES UR QL STRIP: ABNORMAL
LEUKOCYTE ESTERASE UR QL STRIP.AUTO: NEGATIVE
NITRITE UR QL STRIP: NEGATIVE
PH UR STRIP.AUTO: 5.5 [PH] (ref 5–8)
PROT UR QL STRIP: ABNORMAL
RBC # UR: NORMAL /HPF
REF LAB TEST METHOD: NORMAL
SP GR UR STRIP: >=1.03 (ref 1–1.03)
SQUAMOUS #/AREA URNS HPF: NORMAL /HPF
UROBILINOGEN UR QL STRIP: ABNORMAL
WBC UR QL AUTO: NORMAL /HPF

## 2017-06-29 PROCEDURE — C1713 ANCHOR/SCREW BN/BN,TIS/BN: HCPCS | Performed by: ORTHOPAEDIC SURGERY

## 2017-06-29 PROCEDURE — 24201 RMVL FB UPPER ARM/ELBW DEEP: CPT | Performed by: ORTHOPAEDIC SURGERY

## 2017-06-29 PROCEDURE — 76000 FLUOROSCOPY <1 HR PHYS/QHP: CPT

## 2017-06-29 PROCEDURE — 25010000002 ONDANSETRON PER 1 MG: Performed by: ANESTHESIOLOGY

## 2017-06-29 PROCEDURE — 25010000002 FENTANYL CITRATE (PF) 100 MCG/2ML SOLUTION: Performed by: ANESTHESIOLOGY

## 2017-06-29 PROCEDURE — 73070 X-RAY EXAM OF ELBOW: CPT

## 2017-06-29 PROCEDURE — C1776 JOINT DEVICE (IMPLANTABLE): HCPCS | Performed by: ORTHOPAEDIC SURGERY

## 2017-06-29 PROCEDURE — 24343 REPR ELBOW LAT LIGMNT W/TISS: CPT | Performed by: ORTHOPAEDIC SURGERY

## 2017-06-29 PROCEDURE — 25010000002 MIDAZOLAM PER 1 MG: Performed by: ANESTHESIOLOGY

## 2017-06-29 PROCEDURE — 81001 URINALYSIS AUTO W/SCOPE: CPT | Performed by: ORTHOPAEDIC SURGERY

## 2017-06-29 PROCEDURE — 25010000002 PROPOFOL 10 MG/ML EMULSION: Performed by: NURSE ANESTHETIST, CERTIFIED REGISTERED

## 2017-06-29 PROCEDURE — 24666 OPTX RADIAL HEAD/NCK FX RPLC: CPT | Performed by: ORTHOPAEDIC SURGERY

## 2017-06-29 PROCEDURE — 25010000003 CEFAZOLIN IN DEXTROSE 2-4 GM/100ML-% SOLUTION: Performed by: ORTHOPAEDIC SURGERY

## 2017-06-29 PROCEDURE — 82962 GLUCOSE BLOOD TEST: CPT

## 2017-06-29 PROCEDURE — 25010000002 FENTANYL CITRATE (PF) 100 MCG/2ML SOLUTION: Performed by: NURSE ANESTHETIST, CERTIFIED REGISTERED

## 2017-06-29 PROCEDURE — 83036 HEMOGLOBIN GLYCOSYLATED A1C: CPT | Performed by: ORTHOPAEDIC SURGERY

## 2017-06-29 DEVICE — IMPLANTABLE DEVICE: Type: IMPLANTABLE DEVICE | Site: ELBOW | Status: FUNCTIONAL

## 2017-06-29 DEVICE — SUT/ANCH JUGGERKNOT SFT RIGID SHT SZ1 1.4MM W/BIT: Type: IMPLANTABLE DEVICE | Site: ELBOW | Status: FUNCTIONAL

## 2017-06-29 RX ORDER — PROMETHAZINE HYDROCHLORIDE 25 MG/ML
12.5 INJECTION, SOLUTION INTRAMUSCULAR; INTRAVENOUS ONCE AS NEEDED
Status: DISCONTINUED | OUTPATIENT
Start: 2017-06-29 | End: 2017-06-29 | Stop reason: HOSPADM

## 2017-06-29 RX ORDER — FENTANYL CITRATE 50 UG/ML
INJECTION, SOLUTION INTRAMUSCULAR; INTRAVENOUS AS NEEDED
Status: DISCONTINUED | OUTPATIENT
Start: 2017-06-29 | End: 2017-06-29 | Stop reason: SURG

## 2017-06-29 RX ORDER — SODIUM CHLORIDE, SODIUM LACTATE, POTASSIUM CHLORIDE, CALCIUM CHLORIDE 600; 310; 30; 20 MG/100ML; MG/100ML; MG/100ML; MG/100ML
9 INJECTION, SOLUTION INTRAVENOUS CONTINUOUS
Status: DISCONTINUED | OUTPATIENT
Start: 2017-06-29 | End: 2017-06-29 | Stop reason: HOSPADM

## 2017-06-29 RX ORDER — SODIUM CHLORIDE 0.9 % (FLUSH) 0.9 %
1-10 SYRINGE (ML) INJECTION AS NEEDED
Status: DISCONTINUED | OUTPATIENT
Start: 2017-06-29 | End: 2017-06-29 | Stop reason: HOSPADM

## 2017-06-29 RX ORDER — LABETALOL HYDROCHLORIDE 5 MG/ML
5 INJECTION, SOLUTION INTRAVENOUS
Status: DISCONTINUED | OUTPATIENT
Start: 2017-06-29 | End: 2017-06-29 | Stop reason: HOSPADM

## 2017-06-29 RX ORDER — ONDANSETRON 2 MG/ML
4 INJECTION INTRAMUSCULAR; INTRAVENOUS ONCE AS NEEDED
Status: DISCONTINUED | OUTPATIENT
Start: 2017-06-29 | End: 2017-06-29 | Stop reason: HOSPADM

## 2017-06-29 RX ORDER — ESMOLOL HYDROCHLORIDE 10 MG/ML
INJECTION INTRAVENOUS AS NEEDED
Status: DISCONTINUED | OUTPATIENT
Start: 2017-06-29 | End: 2017-06-29 | Stop reason: SURG

## 2017-06-29 RX ORDER — ACETAMINOPHEN 500 MG
1000 TABLET ORAL EVERY 8 HOURS PRN
COMMUNITY
End: 2017-06-29 | Stop reason: HOSPADM

## 2017-06-29 RX ORDER — PROMETHAZINE HYDROCHLORIDE 25 MG/1
25 SUPPOSITORY RECTAL ONCE AS NEEDED
Status: DISCONTINUED | OUTPATIENT
Start: 2017-06-29 | End: 2017-06-29 | Stop reason: HOSPADM

## 2017-06-29 RX ORDER — MIDAZOLAM HYDROCHLORIDE 1 MG/ML
1 INJECTION INTRAMUSCULAR; INTRAVENOUS
Status: DISCONTINUED | OUTPATIENT
Start: 2017-06-29 | End: 2017-06-29 | Stop reason: HOSPADM

## 2017-06-29 RX ORDER — FENTANYL CITRATE 50 UG/ML
50 INJECTION, SOLUTION INTRAMUSCULAR; INTRAVENOUS
Status: DISCONTINUED | OUTPATIENT
Start: 2017-06-29 | End: 2017-06-29 | Stop reason: HOSPADM

## 2017-06-29 RX ORDER — FAMOTIDINE 10 MG/ML
20 INJECTION, SOLUTION INTRAVENOUS ONCE
Status: COMPLETED | OUTPATIENT
Start: 2017-06-29 | End: 2017-06-29

## 2017-06-29 RX ORDER — BUPIVACAINE HYDROCHLORIDE AND EPINEPHRINE 5; 5 MG/ML; UG/ML
INJECTION, SOLUTION EPIDURAL; INTRACAUDAL; PERINEURAL AS NEEDED
Status: DISCONTINUED | OUTPATIENT
Start: 2017-06-29 | End: 2017-06-29 | Stop reason: SURG

## 2017-06-29 RX ORDER — DOCUSATE SODIUM 100 MG/1
100 CAPSULE, LIQUID FILLED ORAL 2 TIMES DAILY
Qty: 50 CAPSULE | Refills: 0 | Status: CANCELLED | OUTPATIENT
Start: 2017-06-29

## 2017-06-29 RX ORDER — CEFAZOLIN SODIUM 2 G/100ML
2 INJECTION, SOLUTION INTRAVENOUS ONCE
Status: COMPLETED | OUTPATIENT
Start: 2017-06-29 | End: 2017-06-29

## 2017-06-29 RX ORDER — PROMETHAZINE HYDROCHLORIDE 12.5 MG/1
12.5 TABLET ORAL EVERY 8 HOURS PRN
Qty: 25 TABLET | Refills: 0 | Status: SHIPPED | OUTPATIENT
Start: 2017-06-29 | End: 2017-08-07

## 2017-06-29 RX ORDER — OXYCODONE AND ACETAMINOPHEN 7.5; 325 MG/1; MG/1
1 TABLET ORAL ONCE AS NEEDED
Status: DISCONTINUED | OUTPATIENT
Start: 2017-06-29 | End: 2017-06-29 | Stop reason: HOSPADM

## 2017-06-29 RX ORDER — HYDRALAZINE HYDROCHLORIDE 20 MG/ML
5 INJECTION INTRAMUSCULAR; INTRAVENOUS
Status: DISCONTINUED | OUTPATIENT
Start: 2017-06-29 | End: 2017-06-29 | Stop reason: HOSPADM

## 2017-06-29 RX ORDER — LIDOCAINE HYDROCHLORIDE 20 MG/ML
INJECTION, SOLUTION INFILTRATION; PERINEURAL AS NEEDED
Status: DISCONTINUED | OUTPATIENT
Start: 2017-06-29 | End: 2017-06-29 | Stop reason: SURG

## 2017-06-29 RX ORDER — MIDAZOLAM HYDROCHLORIDE 1 MG/ML
2 INJECTION INTRAMUSCULAR; INTRAVENOUS
Status: DISCONTINUED | OUTPATIENT
Start: 2017-06-29 | End: 2017-06-29 | Stop reason: HOSPADM

## 2017-06-29 RX ORDER — NALOXONE HCL 0.4 MG/ML
0.2 VIAL (ML) INJECTION AS NEEDED
Status: DISCONTINUED | OUTPATIENT
Start: 2017-06-29 | End: 2017-06-29 | Stop reason: HOSPADM

## 2017-06-29 RX ORDER — DIPHENHYDRAMINE HYDROCHLORIDE 50 MG/ML
12.5 INJECTION INTRAMUSCULAR; INTRAVENOUS
Status: DISCONTINUED | OUTPATIENT
Start: 2017-06-29 | End: 2017-06-29 | Stop reason: HOSPADM

## 2017-06-29 RX ORDER — MAGNESIUM HYDROXIDE 1200 MG/15ML
LIQUID ORAL AS NEEDED
Status: DISCONTINUED | OUTPATIENT
Start: 2017-06-29 | End: 2017-06-29 | Stop reason: HOSPADM

## 2017-06-29 RX ORDER — HYDROMORPHONE HYDROCHLORIDE 1 MG/ML
0.5 INJECTION, SOLUTION INTRAMUSCULAR; INTRAVENOUS; SUBCUTANEOUS
Status: DISCONTINUED | OUTPATIENT
Start: 2017-06-29 | End: 2017-06-29 | Stop reason: HOSPADM

## 2017-06-29 RX ORDER — EPHEDRINE SULFATE 50 MG/ML
5 INJECTION, SOLUTION INTRAVENOUS ONCE AS NEEDED
Status: DISCONTINUED | OUTPATIENT
Start: 2017-06-29 | End: 2017-06-29 | Stop reason: HOSPADM

## 2017-06-29 RX ORDER — FLUMAZENIL 0.1 MG/ML
0.2 INJECTION INTRAVENOUS AS NEEDED
Status: DISCONTINUED | OUTPATIENT
Start: 2017-06-29 | End: 2017-06-29 | Stop reason: HOSPADM

## 2017-06-29 RX ORDER — PROPOFOL 10 MG/ML
VIAL (ML) INTRAVENOUS AS NEEDED
Status: DISCONTINUED | OUTPATIENT
Start: 2017-06-29 | End: 2017-06-29 | Stop reason: SURG

## 2017-06-29 RX ORDER — OXYCODONE AND ACETAMINOPHEN 7.5; 325 MG/1; MG/1
1-2 TABLET ORAL EVERY 4 HOURS PRN
Qty: 60 TABLET | Refills: 0 | Status: SHIPPED | OUTPATIENT
Start: 2017-06-29 | End: 2017-09-06

## 2017-06-29 RX ORDER — HYDROCODONE BITARTRATE AND ACETAMINOPHEN 7.5; 325 MG/1; MG/1
1 TABLET ORAL ONCE AS NEEDED
Status: COMPLETED | OUTPATIENT
Start: 2017-06-29 | End: 2017-06-29

## 2017-06-29 RX ORDER — PROMETHAZINE HYDROCHLORIDE 25 MG/1
25 TABLET ORAL ONCE AS NEEDED
Status: DISCONTINUED | OUTPATIENT
Start: 2017-06-29 | End: 2017-06-29 | Stop reason: HOSPADM

## 2017-06-29 RX ORDER — PROMETHAZINE HYDROCHLORIDE 12.5 MG/1
12.5 TABLET ORAL EVERY 8 HOURS PRN
Qty: 25 TABLET | Refills: 0 | Status: CANCELLED | OUTPATIENT
Start: 2017-06-29

## 2017-06-29 RX ORDER — DOCUSATE SODIUM 100 MG/1
100 CAPSULE, LIQUID FILLED ORAL 2 TIMES DAILY
Qty: 50 CAPSULE | Refills: 0 | Status: SHIPPED | OUTPATIENT
Start: 2017-06-29 | End: 2017-08-07

## 2017-06-29 RX ORDER — PROMETHAZINE HYDROCHLORIDE 25 MG/1
12.5 TABLET ORAL ONCE AS NEEDED
Status: DISCONTINUED | OUTPATIENT
Start: 2017-06-29 | End: 2017-06-29 | Stop reason: HOSPADM

## 2017-06-29 RX ORDER — OXYCODONE AND ACETAMINOPHEN 7.5; 325 MG/1; MG/1
2 TABLET ORAL EVERY 4 HOURS PRN
Qty: 50 TABLET | Refills: 0 | Status: CANCELLED | OUTPATIENT
Start: 2017-06-29

## 2017-06-29 RX ORDER — ONDANSETRON 2 MG/ML
INJECTION INTRAMUSCULAR; INTRAVENOUS AS NEEDED
Status: DISCONTINUED | OUTPATIENT
Start: 2017-06-29 | End: 2017-06-29 | Stop reason: SURG

## 2017-06-29 RX ADMIN — ONDANSETRON 4 MG: 2 INJECTION INTRAMUSCULAR; INTRAVENOUS at 16:35

## 2017-06-29 RX ADMIN — MIDAZOLAM 2 MG: 1 INJECTION INTRAMUSCULAR; INTRAVENOUS at 14:11

## 2017-06-29 RX ADMIN — BUPIVACAINE HYDROCHLORIDE AND EPINEPHRINE BITARTRATE 30 ML: 5; .0091 INJECTION, SOLUTION EPIDURAL; INTRACAUDAL; PERINEURAL at 14:21

## 2017-06-29 RX ADMIN — MIDAZOLAM 2 MG: 1 INJECTION INTRAMUSCULAR; INTRAVENOUS at 13:45

## 2017-06-29 RX ADMIN — FENTANYL CITRATE 50 MCG: 50 INJECTION INTRAMUSCULAR; INTRAVENOUS at 14:47

## 2017-06-29 RX ADMIN — FENTANYL CITRATE 50 MCG: 50 INJECTION INTRAMUSCULAR; INTRAVENOUS at 15:17

## 2017-06-29 RX ADMIN — PROPOFOL 225 MG: 10 INJECTION, EMULSION INTRAVENOUS at 14:55

## 2017-06-29 RX ADMIN — SODIUM CHLORIDE, POTASSIUM CHLORIDE, SODIUM LACTATE AND CALCIUM CHLORIDE: 600; 310; 30; 20 INJECTION, SOLUTION INTRAVENOUS at 14:43

## 2017-06-29 RX ADMIN — SODIUM CHLORIDE, POTASSIUM CHLORIDE, SODIUM LACTATE AND CALCIUM CHLORIDE 9 ML/HR: 600; 310; 30; 20 INJECTION, SOLUTION INTRAVENOUS at 13:45

## 2017-06-29 RX ADMIN — FENTANYL CITRATE 50 MCG: 50 INJECTION INTRAMUSCULAR; INTRAVENOUS at 15:58

## 2017-06-29 RX ADMIN — FENTANYL CITRATE 50 MCG: 50 INJECTION INTRAMUSCULAR; INTRAVENOUS at 14:11

## 2017-06-29 RX ADMIN — CEFAZOLIN SODIUM 2 G: 2 INJECTION, SOLUTION INTRAVENOUS at 14:49

## 2017-06-29 RX ADMIN — LIDOCAINE HYDROCHLORIDE 100 MG: 20 INJECTION, SOLUTION INFILTRATION; PERINEURAL at 14:55

## 2017-06-29 RX ADMIN — ESMOLOL HYDROCHLORIDE 30 MG: 10 INJECTION, SOLUTION INTRAVENOUS at 15:54

## 2017-06-29 RX ADMIN — HYDROCODONE BITARTRATE AND ACETAMINOPHEN 1 TABLET: 7.5; 325 TABLET ORAL at 18:25

## 2017-06-29 RX ADMIN — FAMOTIDINE 20 MG: 10 INJECTION, SOLUTION INTRAVENOUS at 13:45

## 2017-06-29 NOTE — ANESTHESIA POSTPROCEDURE EVALUATION
Patient: Roni Villaseñor    Procedure Summary     Date Anesthesia Start Anesthesia Stop Room / Location    06/29/17 1445 2153  [facility] /  [facility]       Procedure Diagnosis Surgeon Provider    RADIAL HEAD ARTHROPLASTY AND LATERAL COLLATERAL LIGAMENT REPAIR (Left Elbow) Elbow injury, left, initial encounter  (Elbow injury, left, initial encounter [S59.902A]) MD Ameya Oglesby MD          Anesthesia Type: general  Last vitals  /97 (06/29/17 1825)    Temp 37.1 °C (98.7 °F) (06/29/17 1803)    Pulse 99 (06/29/17 1825)   Resp 18 (06/29/17 1825)    SpO2 97 % (06/29/17 1825)      Post Anesthesia Care and Evaluation    Patient location during evaluation: PHASE II  Patient participation: complete - patient participated  Level of consciousness: awake  Pain management: adequate  Airway patency: patent  Anesthetic complications: No anesthetic complications    Cardiovascular status: acceptable  Respiratory status: acceptable  Hydration status: acceptable

## 2017-06-29 NOTE — ANESTHESIA PROCEDURE NOTES
Peripheral Block    Start time: 6/29/2017 2:16 PM  Stop time: 6/29/2017 2:21 PM  Reason for block: procedure for pain, at surgeon's request and post-op pain management  Performed by  Anesthesiologist: JUDAH ANGEL  Preanesthetic Checklist  Completed: patient identified, surgical consent, pre-op evaluation, timeout performed and risks and benefits discussed  Peripheral Block Prep:  Sterile barriers:cap and gloves  Prep: ChloraPrep and alcohol swabs  Patient monitoring: blood pressure monitoring, continuous pulse oximetry and EKG  Peripheral Procedure  Sedation:yes  Guidance:ultrasound guided  Images:still images obtained    Laterality:left  Block Type:supraclavicular and interscalene  Injection Technique:single-shot  Needle Type:echogenic  Needle Gauge:22 G  ULTRASOUND INTERPRETATION. Using ultrasound guidance a gauge needle was placed in close proximity to the brachial plexus nerve, at which point, under ultrasound guidance anesthetic was injected in the area of the nerve and spread of the anesthesia was seen on ultrasound in close proximity thereto.  There were no abnormalities seen on ultrasound; a digital image was taken; and the patient tolerated the procedure with no complications.   Medications  Local Injected:bupivacaine 0.5% with epinephrine Local Amount Injected:30mL  Post Assessment  Injection Assessment: negative aspiration for heme, no paresthesia on injection and incremental injection  Patient Tolerance:comfortable throughout block  Complications:no  Additional Notes  ULTRASOUND INTERPRETATION. Using ultrasound guidance theneedle was placed in close proximity to the nerve, at which point, under ultrasound guidance, local anesthetic was injected around but not in the nerve and spread of the anesthesia was seen on ultrasound in close proximity thereto.  There were no abnormalities seen on ultrasound; a digital image was taken; and the patient tolerated the procedure with no complications.

## 2017-06-29 NOTE — ANESTHESIA PREPROCEDURE EVALUATION
Anesthesia Evaluation     NPO Solid Status: > 8 hours  NPO Liquid Status: > 2 hours     Airway   Mallampati: III  TM distance: >3 FB  Neck ROM: full  no difficulty expected  Dental - normal exam     Pulmonary     breath sounds clear to auscultation  (+) shortness of breath,   Cardiovascular     Rhythm: regular  Rate: normal    (+) hypertension,     ROS comment: No ischemia on stress test, normal echo    Neuro/Psych  GI/Hepatic/Renal/Endo    (+)  diabetes mellitus type 2 poorly controlled,     Musculoskeletal     Abdominal    Substance History      OB/GYN          Other                                        Anesthesia Plan    ASA 2     general   (Supraclavicular block)  Anesthetic plan and risks discussed with patient.

## 2017-07-12 ENCOUNTER — OFFICE VISIT (OUTPATIENT)
Dept: ORTHOPEDIC SURGERY | Facility: CLINIC | Age: 47
End: 2017-07-12

## 2017-07-12 VITALS — BODY MASS INDEX: 31.47 KG/M2 | WEIGHT: 207 LBS | TEMPERATURE: 99.1 F

## 2017-07-12 DIAGNOSIS — Z98.890 HX OF ELBOW SURGERY: Primary | ICD-10-CM

## 2017-07-12 PROCEDURE — 73070 X-RAY EXAM OF ELBOW: CPT | Performed by: ORTHOPAEDIC SURGERY

## 2017-07-12 PROCEDURE — 99024 POSTOP FOLLOW-UP VISIT: CPT | Performed by: ORTHOPAEDIC SURGERY

## 2017-07-12 NOTE — PROGRESS NOTES
"Mr. Villaseñor comes in today for follow-up of the left elbow.  He tells me it is doing well.  He is off of the pain medicine.  He tells me that he has felt like the elbow is more stable than before surgery.  He tells me that before surgery he could feel things \"sloshing around\" in the splint.  He tells me that he has not noticed that sensation since surgery.    His blood was in place and removed.  His incision looks great.  There is no erythema or drainage.  Contour of the elbow looks normal and his edema is minimal.  The arm and forearm are soft.  Range of motion is from 110° of flexion to 60° shy of full extension.  Pronation and supination are both approximately 60°.  Good motor and sensory function in the hand.    AP and lateral views of the left elbow are ordered and reviewed.  These are compared to previous x-rays.  Both x-rays were taken in the splint and so the rotation is off but the radiocapitellar and ulnohumeral joints appear well aligned.  No complicating process noted.    Assessment: 2 weeks status post left radial head arthroplasty and lateral collateral ligament repair for terrible triad type injury    Plan: I converted him to a hinged brace today.  I'm going to let him start working on progressive range of motion.  He can work on unrestricted flexion and extension to 50° and then we will have him advance it 10° per week.  I want to see him back in 2 weeks to reevaluate.  At that point, he should be at roughly 30° shy of full extension and I anticipate getting him out of the brace.      Dmitry Davila MD    "

## 2017-07-20 NOTE — OP NOTE
Orthopaedic Operative Note    Facility: [facility]    Patient: Roni Villaseñor    Medical Record Number: [id number]    YOB: 1970    Dictating Surgeon: Dmitry Davila M.D.*    Primary Care Physician: No Known Provider    Date of Operation: 6/29/2017  Pre-Operative Diagnosis:  Left elbow fracture dislocation with terrible triad injury    Post-Operative Diagnosis:  Left elbow fracture dislocation with terrible triad injury     Procedure Performed:  1.  Left radial head arthroplasty  2.  Lateral collateral ligament repair  3.  Excision of comminuted coronoid process fracture    Surgeon: Dmitry Davila MD     Assistant: Shravan Faria    Anesthesia: Regional followed by Gen.    Complications: None.     Estimated Blood Loss: Less than 50 mL.     Implants: Biomet Explor radial head system with size 8 x 28 mm stem and 10 x 24 mm head;  5 mini juggernaut anchors for lateral collateral ligament repair    Brief Operative Indication:  Mr. Villaseñor sustained a left elbow fracture dislocation in a fall.  He had a highly unstable elbow which was felt to warrant surgical repair.  The risks, benefits, and alternatives to radial head arthroplasty, lateral collateral ligament repair, and possible surgical fixation of coronoid process were discussed with him in detail.  He acknowledged understanding of this information and consented to proceed.  Specifically, we did talk about the highly unstable nature of this injury and relative high risk of postoperative complications.  He acknowledged understanding of this information and consented to proceed.    Description of the procedure in detail:  The patient and operative site were identified in the preoperative holding area.  The surgical site was marked.  Adequate regional anesthesia of the left upper extremity was administered.  The patient was then taken to the operating room.  He was placed in the supine position and then adequate general anesthesia administered.   He was then transition to the operating table.  The arm was placed on a radiolucent arm board.  Preoperative imaging of the left upper extremity was obtained confirming the comminuted fracture dislocation.  I also obtained imaging of the contralateral lateral elbow for intraoperative comparison.  The left upper extremity was prepped and draped in the standard, sterile fashion.  I cleaned the extremity with an alcohol solution.  A Hibiclens scrub was performed and then the extremity was prepped with 2 ChloraPrep preps.  I allowed those to dry for 3 minutes before the draping procedure was carried out.  The arm was exsanguinated with an Esmarch bandage and then the tourniquet insufflated to 200 mmHg.  A timeout was taken and preoperative antibiotics administered prior to surgical incision.    I began by fashioning a 5 cm incision over the lateral aspect of the elbow.  This was carried down through the skin and subcutaneous tissues.  Upon dissecting down through the subcutaneous tissues, I encountered a large defect in the extensor mechanism.  There was a rent in the tissues between the extensor carpi radialis longus and common extensor.  This was developed to expose the fracture hematoma which was evacuated.  Several fragments of bone and articular cartilage washed out of the joint at this point.  He was noted to have complete disruption of the lateral sided structures and none of the lateral sided structures remained intact or attached including the extensors and lateral collateral ligament.  At this point, the joint was exposed.  I carefully dissected down along the radial neck.  I did take great caution to keep the posterior interosseous nerve protected throughout the case and had my assistant maintained the forearm rotated so as to keep the nerve protected.    The radial neck was exposed.  There was a very comminuted fracture of the radial head involving more than two thirds of the overall articular surface.   Furthermore, there was comminution and greater than 4 parts to the fracture.  The retractors were positioned to allow for complete exposure of the radial neck.  An oscillating saw was used to carry out the cut of the proximal radius in the typical fashion.  Cut portion of this was removed followed by the fragmentation of the head.  This was taken to the back table and sized.  The 10 x 24 seemed to match up best with the cut surface and negatives head.  Next, I directed my attention to the coronoid.  There was extensive comminution of the coronoid.  I had hoped for a large fragment which I might be able to repair.  Unfortunately, that was not the case with this patient.  The coronoid process was fragmented and I did not feel that there was sufficient bone with which to work.  I ended up having to excise those fragments.  I did make an attempt to repair the capsule down to the defect in the tip of the coronoid.  Unfortunately, this stitch ripped through the capsule and I abandoned attempts at repairing that structure.  At this point, I determined to go ahead and replace the radial head and repair the lateral sided structures.  If the elbow was still unstable at that point, I determined that I would then go back and repair the capsule.    The radius was reamed and broached in the typical fashion.  I trialed with 8 mm stem and 10 x 24 mm head.  It seemed to fit very well and restored good motion and stability.  Furthermore, the implant seemed to match up nicely with the native anatomy on the contralateral side.  The trial components were removed.  The final component impacted.  The stem seated well.  The head was confirmed to be well-seated on the stem and the set screw was maximally tightened.  Again, I checked that the elbow had full fluid passive motion.  This was confirmed to be the case.  The elbow was unstable beyond about 30° of extension but I had not yet repaired the lateral collateral ligament at this point.   The lateral wall of the capitellum and distal humerus was carefully exposed.  Multiple juggernaut anchors were placed through the lateral wall to allow for repair of both the lateral collateral ligament and extensor tendon.  The stitches were used to anatomically repair those structures and the sutures were tied down with 6 throw surgeon's knots and a series of reverse half hitches.  This seemed to work well to repair the lateral sided structures.  At this point, the elbow demonstrated full motion and excellent stability.  Final images were taken and saved.    The wound was irrigated out copiously with sterile saline and then closed in a layered fashion using Vicryl to repair the rent in the extensor mechanism, and Vicryl to repair the subcutaneous tissues.  A running subcuticular stitch was used to close the skin followed by Steri-Strips.  A splint was applied and then the patient was awakened and taken to the recovery room.  He tolerated the procedure well.  There were no complications.    Addendum: Of note, this note was dictated on the day of service.  Apparently the note was lost due to a computer virus.    Dmitry Davila MD  07/20/17

## 2017-07-24 ENCOUNTER — OFFICE VISIT (OUTPATIENT)
Dept: ORTHOPEDIC SURGERY | Facility: CLINIC | Age: 47
End: 2017-07-24

## 2017-07-24 VITALS — WEIGHT: 205 LBS | BODY MASS INDEX: 31.07 KG/M2 | TEMPERATURE: 98.8 F | HEIGHT: 68 IN

## 2017-07-24 DIAGNOSIS — Z98.890 HISTORY OF ELBOW SURGERY: Primary | ICD-10-CM

## 2017-07-24 PROCEDURE — 73070 X-RAY EXAM OF ELBOW: CPT | Performed by: ORTHOPAEDIC SURGERY

## 2017-07-24 PROCEDURE — 99024 POSTOP FOLLOW-UP VISIT: CPT | Performed by: ORTHOPAEDIC SURGERY

## 2017-07-24 RX ORDER — NAPROXEN SODIUM 220 MG
220 TABLET ORAL 2 TIMES DAILY PRN
COMMUNITY
End: 2019-11-08 | Stop reason: HOSPADM

## 2017-07-24 NOTE — PROGRESS NOTES
Chief Complaint:  4 weeks s/p left radial head arthroplasty and LCL repair    HPI:  Mr. Villaseñor comes in for follow-up.  He tells me that his pain is much better.  He also tells me that his motion is improved.  He has been fully compliant with use of the brace.  Denies any complaints or problems.  He has not noticed any subjective instability of the elbow.    Exam:  His incision is healed.  Mild tenderness along the lateral side of the elbow but no significant edema.  No erythema or effusion.  Range of motion is from 110° of flexion to 45° shy of full extension.  Pronation is 70°, supination is 60°.    Imaging:  AP and lateral views of the left elbow are ordered and reviewed.  These are compared to previous x-rays.  No complicating process noted.  The joint remains concentrically reduced.  The AP view is difficult to  due to the flexion of the elbow at the time the x-rays were taken.    Assessment:  4 weeks s/p left radial head arthroplasty and LCL repair    Plan:  I have recommended that we get him out of the brace and have him start working on progressive range of motion.  Whereas I was initially concerned about recurrent instability and wanted to err on the side of protecting him, I think the balance has started to shift and I am now more worried about postoperative arthrofibrosis.  I have given him a referral to physical therapy and counseled him on exercises to work on at home.  I also demonstrated these exercises.  We talked about appropriate activity modifications and restrictions.  I want to see him back in 1 month.    Dmitry Davila MD  07/24/2017

## 2017-07-28 ENCOUNTER — TREATMENT (OUTPATIENT)
Dept: PHYSICAL THERAPY | Facility: CLINIC | Age: 47
End: 2017-07-28

## 2017-07-28 ENCOUNTER — TELEPHONE (OUTPATIENT)
Dept: ORTHOPEDIC SURGERY | Facility: CLINIC | Age: 47
End: 2017-07-28

## 2017-07-28 DIAGNOSIS — Z98.890 HISTORY OF ELBOW SURGERY: Primary | ICD-10-CM

## 2017-07-28 PROCEDURE — 97161 PT EVAL LOW COMPLEX 20 MIN: CPT | Performed by: PHYSICAL THERAPIST

## 2017-07-28 PROCEDURE — 97110 THERAPEUTIC EXERCISES: CPT | Performed by: PHYSICAL THERAPIST

## 2017-07-28 PROCEDURE — 97140 MANUAL THERAPY 1/> REGIONS: CPT | Performed by: PHYSICAL THERAPIST

## 2017-07-28 PROCEDURE — 97014 ELECTRIC STIMULATION THERAPY: CPT | Performed by: PHYSICAL THERAPIST

## 2017-07-28 NOTE — PROGRESS NOTES
Physical Therapy Initial Evaluation and Plan of Care    Patient: Roni Villaseñor   : 1970  Diagnosis/ICD-10 Code:  History of elbow surgery [Z98.890]  Referring practitioner: Dmitry Davila MD  Date of Initial Visit: 2017  Today's Date: 2017  Patient seen for 1 sessions           Subjective Questionnaire: QuickDASH: 29.55      Subjective Evaluation    History of Present Illness  Date of surgery: 2017  Mechanism of injury: Pt reports he jumped from high ledge and fell on L side, shattering L elbow. Had L radial arthroplasty and lateral collateral ligament repair 17. Pt was in hard cast for 2 weeks then transitioned to hinged brace. Has been out of brace since Monday.  Pt reports he has pain in the morning. Has a prescription for Percoset, but takes this as little as possible. Takes Aleve prn. Pt reports he sleeps on back and sides; sleep not interrupted by pain.  Wears ace wrap to cushion elbow.  Denies previous R UE injuries.  Denies tingling or numbness.      Patient Occupation: [employer]  Current pain ratin  At worst pain ratin  Location: L elbow, L wrist  Quality: dull ache  Relieving factors: medications, change in position and ice  Aggravating factors: prolonged positioning  Progression: improved    Hand dominance: ambidextrous (Read and write w/ right)    Treatments  Previous treatment: immobilization  Current treatment: physical therapy  Patient Goals  Patient goals for therapy: decreased pain, increased strength and increased motion  Patient goal: Short Term Goals: 4 weeks. Patient will:  1. Be independent with initial HEP  2. Be instructed in posture and body mechanics.  3. Demonstrate elbow PROM WNL to allow for progressing of therapy exercises.    Long Term Goals: 8-12 weeks. Pt will:  1. Exhibit (L) elbow AROM to WFL to allow for ADLs without pain limiting function.  2. Demonstrate improved Left UE MMT of >/= 4+/5 to allow for performance of ADL's/household  management/recreational activities.  3. Demonstrate improved Left UE  >/= R to allow for performance of ADL's/household management/recreational activities.  4. Report perceived disability </=10% based on QuickDASH           Objective     Palpation     Right Tenderness of the wrist extensors and wrist flexors.     Tenderness     Left Elbow   Tenderness in the lateral epicondyle, medial epicondyle and radial head. No tenderness in the olecranon process.     Left Wrist/Hand   Tenderness in the lateral epicondyle and medial epicondyle. No tenderness in the olecranon process.     Neurological Testing     Additional Neurological Details  Denies tingling/numbness  Denies tingling/numbness    Active Range of Motion     Left Elbow   Flexion: 115 degrees   Extension: 38 degrees   Forearm supination: 50 degrees   Forearm pronation: 62 degrees     Strength/Myotome Testing     Left Wrist/Hand      (2nd hand position)     Trial 1: 44    Trial 2: 47    Trial 3: 47    Average: 46    Right Wrist/Hand      (2nd hand position)     Trial 1: 95    Trial 2: 100    Trial 3: 102    Average: 99         Assessment & Plan     Assessment  Impairments: abnormal muscle tone, abnormal or restricted ROM, activity intolerance, impaired physical strength, lacks appropriate home exercise program and pain with function  Assessment details: Pt will benefit from skilled PT services in order to address listed impairments and increase tolerance to normal daily activities including ADL's, and recreational activities.    Prognosis: good    Goals  Short Term Goals: 4 weeks. Patient will:  1. Be independent with initial HEP  2. Be instructed in posture and body mechanics.  3. Demonstrate elbow PROM WNL to allow for progressing of therapy exercises.    Long Term Goals: 8-12 weeks. Pt will:  1. Exhibit (L) elbow AROM to WFL to allow for ADLs without pain limiting function.  2. Demonstrate improved Left UE MMT of >/= 4+/5 to allow for performance of  ADL's/household management/recreational activities.  3. Demonstrate improved Left UE  >/= R to allow for performance of ADL's/household management/recreational activities.  4. Report perceived disability </=10% based on QuickDASH    Plan  Therapy options: will be seen for skilled physical therapy services  Planned modality interventions: cryotherapy, electrical stimulation/Russian stimulation, thermotherapy (hydrocollator packs), ultrasound and iontophoresis  Planned therapy interventions: ADL retraining, balance/weight-bearing training, body mechanics training, flexibility, functional ROM exercises, home exercise program, joint mobilization, manual therapy, neuromuscular re-education, soft tissue mobilization, spinal/joint mobilization, strengthening, stretching and therapeutic activities  Frequency: 3x week  Duration in weeks: 12  Treatment plan discussed with: patient  Functional Limitations: carrying objects, sleeping, pulling, pushing, uncomfortable because of pain, reaching behind back, reaching overhead and unable to perform repetitive tasks      Manual Therapy:    10     mins  14042;  Therapeutic Exercise:    10     mins  26808;     Neuromuscular Javier:    0    mins  87450;    Therapeutic Activity:     0     mins  33281;     Gait Trainin     mins  07342;     Ultrasound:     0     mins  11958;    Electrical Stimulation:    15     mins  15229 ( );  Dry Needling     0     mins self-pay    Timed Treatment:   35   mins   Total Treatment:     65   mins    PT SIGNATURE: Gege Enrique, SHARITA   DATE TREATMENT INITIATED: 2017    Initial Certification  Certification Period: 10/26/2017  I certify that the therapy services are furnished while this patient is under my care.  The services outlined above are required by this patient, and will be reviewed every 90 days.     PHYSICIAN: Dmitry Davila MD      DATE:     Please sign and return via fax to [phone].. Thank you, [facility]  Therapy.

## 2017-07-28 NOTE — PATIENT INSTRUCTIONS
Issued patient putty; paid in clinic.  Pt given HEP and educated re exercises.  Pt educated re diagnosis, relevant anatomy, prognosis, and POC.

## 2017-08-01 ENCOUNTER — TREATMENT (OUTPATIENT)
Dept: PHYSICAL THERAPY | Facility: CLINIC | Age: 47
End: 2017-08-01

## 2017-08-01 DIAGNOSIS — Z98.890 HISTORY OF ELBOW SURGERY: Primary | ICD-10-CM

## 2017-08-01 PROCEDURE — 97140 MANUAL THERAPY 1/> REGIONS: CPT | Performed by: PHYSICAL THERAPIST

## 2017-08-01 NOTE — PROGRESS NOTES
Physical Therapy Daily Progress Note    Visit #: 2  Roni Villaseñor reports: HEP is going good. Feeling popping in joint. Estim didn't help with pain but ice felt good. Most painful is turning of the forearm. Using putty regularly.   Pain Scale (0-10):     Subjective       Objective     Active Range of Motion     Left Elbow   Extension: 36 degrees     Additional Active Range of Motion Details  Significant muscle guarding and tightness in bicep with ext       See Exercise, Manual, and Modality Logs for complete treatment.     PROCEDURES AND MODALITIES:  Paraffin:   pre-rx  Moist Heat:    Ice: Rx Minutes: 10 mins post-rx  E-Stim:    Ultrasound:    Ionto:   Traction:    Dry Needling:         Manual PT [id number] 25 minutes     Timed Code Treatment: 25 Minutes  Total Treatment Time: 40 Minutes    Assessment/Plan  Allowed patient to increase AROM and stretching to 2 x a day as there was not increased pain at rest and no increased edema. Skipped Estim today as patient reported minimal change in pain. Slight improvement in elbow ext measured today. Patient reported compliance with HEP.     Progress per Plan of Care      Bernadette Ruffin PT, DPT  Physical Therapist  [place] License # [id number]

## 2017-08-04 ENCOUNTER — TREATMENT (OUTPATIENT)
Dept: PHYSICAL THERAPY | Facility: CLINIC | Age: 47
End: 2017-08-04

## 2017-08-04 DIAGNOSIS — Z98.890 HISTORY OF ELBOW SURGERY: Primary | ICD-10-CM

## 2017-08-04 PROCEDURE — 97140 MANUAL THERAPY 1/> REGIONS: CPT | Performed by: PHYSICAL THERAPIST

## 2017-08-04 NOTE — PROGRESS NOTES
Physical Therapy Daily Progress Note      Subjective   Pt reports he has been stretching daily. Flexion improving. Extension still difficult and I don't feel like I'm making much progress. Rotation is the most painful.    Objective   See Exercise, Manual, and Modality Logs for complete treatment.       Assessment/Plan  Fair tolerance to elbow and shoulder PROM. Good tolerance to new exercises and was able to ice in positional extension with 1lb in hand. Will continue to benefit from skilled PT to improve AROM and strength.                 Manual Therapy:    25     mins  04659;  Therapeutic Exercise:    10     mins  39043;     Neuromuscular Javier:    0    mins  33651;    Therapeutic Activity:     0     mins  24871;     Gait Trainin     mins  94316;     Ultrasound:     0     mins  87302;    Work Hardening           0      mins 43159  Iontophoresis               0   mins 38928    Timed Treatment:   35   mins   Total Treatment:     45   mins    Gege Enrique, PT  Physical Therapist

## 2017-08-07 ENCOUNTER — OFFICE VISIT (OUTPATIENT)
Dept: CARDIOLOGY | Facility: CLINIC | Age: 47
End: 2017-08-07

## 2017-08-07 ENCOUNTER — TREATMENT (OUTPATIENT)
Dept: PHYSICAL THERAPY | Facility: CLINIC | Age: 47
End: 2017-08-07

## 2017-08-07 VITALS
DIASTOLIC BLOOD PRESSURE: 98 MMHG | WEIGHT: 204 LBS | HEIGHT: 68 IN | BODY MASS INDEX: 30.92 KG/M2 | SYSTOLIC BLOOD PRESSURE: 130 MMHG | HEART RATE: 89 BPM

## 2017-08-07 DIAGNOSIS — F17.210 CIGARETTE NICOTINE DEPENDENCE WITHOUT COMPLICATION: ICD-10-CM

## 2017-08-07 DIAGNOSIS — Z98.890 HISTORY OF ELBOW SURGERY: Primary | ICD-10-CM

## 2017-08-07 DIAGNOSIS — R94.39 ABNORMAL NUCLEAR STRESS TEST: Primary | ICD-10-CM

## 2017-08-07 DIAGNOSIS — Z78.9 ELECTRONIC CIGARETTE USE: ICD-10-CM

## 2017-08-07 PROCEDURE — 97140 MANUAL THERAPY 1/> REGIONS: CPT | Performed by: PHYSICAL THERAPIST

## 2017-08-07 PROCEDURE — 97110 THERAPEUTIC EXERCISES: CPT | Performed by: PHYSICAL THERAPIST

## 2017-08-07 PROCEDURE — 99213 OFFICE O/P EST LOW 20 MIN: CPT | Performed by: INTERNAL MEDICINE

## 2017-08-07 NOTE — PROGRESS NOTES
Physical Therapy Daily Progress Note      Subjective   Pt reports no new changes with elbow. Shoulder is a little less stiff/sore.     Objective     Active Range of Motion     Left Elbow   Flexion: 122 degrees   Extension: 38 degrees     Additional Active Range of Motion Details  38deg shy of 0deg ext     See Exercise, Manual, and Modality Logs for complete treatment.       Assessment/Plan   Improved AROM elbow flexion. Limitations in L shoulder, elbow, and wrist ROM persist. Pain remains well controlled. Will continue to benefit from skilled PT to improve L UE AROM and strength so that he can return to PLOF.                 Manual Therapy:    25     mins  42677;  Therapeutic Exercise:    15     mins  56038;     Neuromuscular Javier:    0    mins  31054;    Therapeutic Activity:     0     mins  58645;     Gait Trainin     mins  05111;     Ultrasound:     0     mins  65246;    Work Hardening           0      mins 30770  Iontophoresis               0   mins 36536    Timed Treatment:   40   mins   Total Treatment:     50   mins    Gege Enrique, PT  Physical Therapist

## 2017-08-09 ENCOUNTER — TREATMENT (OUTPATIENT)
Dept: PHYSICAL THERAPY | Facility: CLINIC | Age: 47
End: 2017-08-09

## 2017-08-09 DIAGNOSIS — Z98.890 HISTORY OF ELBOW SURGERY: Primary | ICD-10-CM

## 2017-08-09 PROCEDURE — 97110 THERAPEUTIC EXERCISES: CPT | Performed by: PHYSICAL THERAPIST

## 2017-08-09 PROCEDURE — 97140 MANUAL THERAPY 1/> REGIONS: CPT | Performed by: PHYSICAL THERAPIST

## 2017-08-09 NOTE — PROGRESS NOTES
Physical Therapy Daily Progress Note      Subjective   Pt reports no new changes.    Objective   See Exercise, Manual, and Modality Logs for complete treatment.       Assessment/Plan  Pt continues to have limited motion and increased pain at shoulder and elbow. Shoulder most limited in abduction. Wrist motion has improved significantly and pt was able to progress weight with wrist strengthening.               Manual Therapy:    25     mins  84992;  Therapeutic Exercise:    10     mins  49267;     Neuromuscular Javier:    0    mins  43137;    Therapeutic Activity:     0     mins  80087;     Gait Trainin     mins  93188;     Ultrasound:     0     mins  64031;    Work Hardening           0      mins 74691  Iontophoresis               0   mins 02039    Timed Treatment:   40   mins   Total Treatment:     50   mins    Gege Enrique, PT  Physical Therapist

## 2017-08-11 ENCOUNTER — TREATMENT (OUTPATIENT)
Dept: PHYSICAL THERAPY | Facility: CLINIC | Age: 47
End: 2017-08-11

## 2017-08-11 DIAGNOSIS — Z98.890 HISTORY OF ELBOW SURGERY: Primary | ICD-10-CM

## 2017-08-11 PROCEDURE — 97110 THERAPEUTIC EXERCISES: CPT | Performed by: PHYSICAL THERAPIST

## 2017-08-11 PROCEDURE — 97140 MANUAL THERAPY 1/> REGIONS: CPT | Performed by: PHYSICAL THERAPIST

## 2017-08-14 ENCOUNTER — TREATMENT (OUTPATIENT)
Dept: PHYSICAL THERAPY | Facility: CLINIC | Age: 47
End: 2017-08-14

## 2017-08-14 DIAGNOSIS — Z98.890 HISTORY OF ELBOW SURGERY: Primary | ICD-10-CM

## 2017-08-14 PROCEDURE — 97110 THERAPEUTIC EXERCISES: CPT | Performed by: PHYSICAL THERAPIST

## 2017-08-14 PROCEDURE — 97140 MANUAL THERAPY 1/> REGIONS: CPT | Performed by: PHYSICAL THERAPIST

## 2017-08-14 NOTE — PROGRESS NOTES
Physical Therapy Daily Progress Note      Subjective   Pt reports he was lackadaisical w/ elbow stretching this weekend.     Objective   See Exercise, Manual, and Modality Logs for complete treatment.       Assessment/Plan  PROM wrist pronation/supination and shoulder flexion/abnduction improving w/ decreased pain. Continues to have greatest limitation in elbow extension.                Manual Therapy:    25     mins  78005;  Therapeutic Exercise:    15     mins  81877;     Neuromuscular Javier:    0    mins  97643;    Therapeutic Activity:     0     mins  40432;     Gait Trainin     mins  00607;     Ultrasound:     0     mins  64723;    Work Hardening           0      mins 45206  Iontophoresis               0   mins 78256    Timed Treatment:   40   mins   Total Treatment:     50   mins    Gege Enrique, PT  Physical Therapist

## 2017-08-16 ENCOUNTER — TREATMENT (OUTPATIENT)
Dept: PHYSICAL THERAPY | Facility: CLINIC | Age: 47
End: 2017-08-16

## 2017-08-16 DIAGNOSIS — Z98.890 HISTORY OF ELBOW SURGERY: Primary | ICD-10-CM

## 2017-08-16 PROCEDURE — 97140 MANUAL THERAPY 1/> REGIONS: CPT | Performed by: PHYSICAL THERAPIST

## 2017-08-16 NOTE — PROGRESS NOTES
Physical Therapy Daily Progress Note    Subjective   Pt reports elbow is stiff after sitting at computer all day.    Objective   See Exercise, Manual, and Modality Logs for complete treatment.       Assessment/Plan  Elbow PROM continues to progress slowly. Pain with distal ulnar mobilization. Shoulder PROM WNL.               Manual Therapy:    25     mins  08168;  Therapeutic Exercise:    10     mins  09495;     Neuromuscular Javier:    0    mins  79632;    Therapeutic Activity:     0     mins  94120;     Gait Trainin     mins  55797;     Ultrasound:     0     mins  28261;    Work Hardening           0      mins 58590  Iontophoresis               0   mins 76513    Timed Treatment:   35   mins   Total Treatment:     45   mins    Gege Enrique, SHARITA  Physical Therapist

## 2017-08-18 ENCOUNTER — TREATMENT (OUTPATIENT)
Dept: PHYSICAL THERAPY | Facility: CLINIC | Age: 47
End: 2017-08-18

## 2017-08-18 DIAGNOSIS — Z98.890 HISTORY OF ELBOW SURGERY: Primary | ICD-10-CM

## 2017-08-18 PROCEDURE — 97110 THERAPEUTIC EXERCISES: CPT | Performed by: PHYSICAL THERAPIST

## 2017-08-18 PROCEDURE — 97140 MANUAL THERAPY 1/> REGIONS: CPT | Performed by: PHYSICAL THERAPIST

## 2017-08-18 NOTE — PROGRESS NOTES
Re-Assessment / Re-Certification        Patient: Roni Villaseñor   : 1970  Diagnosis/ICD-10 Code:  History of elbow surgery [Z98.890]  Referring practitioner: Dmitry Davila MD  Date of Initial Visit: 2017  Today's Date: 2017  Patient seen for 9 sessions      Subjective:   Roni Villaseñor reports: Elbow is stiff. It is always most stiff in the morning. Rates pain 3/10. He is now able to eat with L hand, open jars, and has less difficulty opening doors. Still noticing obvious weakness on L.  Clinical Progress: improved  Home Program Compliance: Yes  Treatment has included: therapeutic exercise, manual therapy, electrical stimulation and cryotherapy    Subjective   Objective     Active Range of Motion     Left Elbow   Flexion: 120 degrees     Passive Range of Motion     Left Elbow   Flexion: 125 degrees   Forearm supination: 78 degrees   Forearm pronation: 78 degrees     Additional Passive Range of Motion Details  30deg shy of full extension    Strength/Myotome Testing     Left Wrist/Hand      (2nd hand position)     Trial 1: 64    Right Wrist/Hand      (2nd hand position)     Trial 1: 114     Assessment/Plan   Wrist and elbow ROM are progressing slowly. Shoulder PROM WNL. Continues to have limited  and UE strength. Good tolerance to manual therapy and there ex. Will continue to benefit from skilled PT to address the goals listed below.      Progress toward previous goals: Partially Met  Short Term Goals: 4 weeks. Patient will:  1. Be independent with initial HEP MET  2. Be instructed in posture and body mechanics. MET  3. Demonstrate elbow PROM WNL to allow for progressing of therapy exercises. NOT MET    New Goals  Long Term Goals: 8-12 weeks. Pt will:  1. Exhibit (L) wrist, elbow, and shoulder AROM to WFL to allow for ADLs without pain limiting function.  2. Demonstrate improved Left UE MMT of >/= 4+/5 to allow for performance of ADL's/household management/recreational activities.  3. Demonstrate  improved Left UE  >/= R to allow for performance of ADL's/household management/recreational activities.  4. Report perceived disability </=10% based on QuickDASH        Recommendations: Continue as planned 3x/week for 6 weeks  Timeframe: 6 weeks  Prognosis to achieve goals: good    PT Signature: Gege Enrique PT      Based upon review of the patient's progress and continued therapy plan, it is my medical opinion that Roni Villaseñor should continue physical therapy treatment at [facility]  [address] [place] [id number] [phone].    Signature: __________________________________  Dmitry Davila MD    Manual Therapy:    30     mins  74628;  Therapeutic Exercise:    20     mins  37831;     Neuromuscular Javier:    0    mins  85221;    Therapeutic Activity:     0     mins  71652;     Gait Trainin     mins  05561;     Ultrasound:     0     mins  97221;    Work Hardening           0      mins 41863  Iontophoresis               0   mins 09310    Timed Treatment:   50   mins   Total Treatment:     60   mins

## 2017-08-23 ENCOUNTER — TREATMENT (OUTPATIENT)
Dept: PHYSICAL THERAPY | Facility: CLINIC | Age: 47
End: 2017-08-23

## 2017-08-23 DIAGNOSIS — Z98.890 HISTORY OF ELBOW SURGERY: Primary | ICD-10-CM

## 2017-08-23 PROCEDURE — 97140 MANUAL THERAPY 1/> REGIONS: CPT | Performed by: PHYSICAL THERAPIST

## 2017-08-23 PROCEDURE — 97110 THERAPEUTIC EXERCISES: CPT | Performed by: PHYSICAL THERAPIST

## 2017-08-23 NOTE — PROGRESS NOTES
Physical Therapy Daily Progress Note    Subjective   Pt reports elbow flexion is less painful. Has been working on extension.    Objective   AROM elbow extension 30deg shy of full extension    See Exercise, Manual, and Modality Logs for complete treatment.       Assessment/Plan   Elbow ROM continues to progress slowly and is limited by pain. Pt was able to demonstrate improved AROM elbow and flexion. Pt is able to reach L clavicle with L hand. Good tolerance to initiation of resisted elbow extension.               Manual Therapy:    30     mins  16366;  Therapeutic Exercise:    10     mins  63541;     Neuromuscular Javier:    0    mins  09883;    Therapeutic Activity:     0     mins  08582;     Gait Trainin     mins  33062;     Ultrasound:     0     mins  34217;    Work Hardening           0      mins 60365  Iontophoresis               0   mins 85967    Timed Treatment:   40   mins   Total Treatment:     45   mins    Gege Enrique PT  Physical Therapist

## 2017-08-25 ENCOUNTER — TREATMENT (OUTPATIENT)
Dept: PHYSICAL THERAPY | Facility: CLINIC | Age: 47
End: 2017-08-25

## 2017-08-25 DIAGNOSIS — Z98.890 HISTORY OF ELBOW SURGERY: Primary | ICD-10-CM

## 2017-08-25 PROCEDURE — 97140 MANUAL THERAPY 1/> REGIONS: CPT | Performed by: PHYSICAL THERAPIST

## 2017-08-25 NOTE — PROGRESS NOTES
Physical Therapy Daily Progress Note    Subjective   Pt reports he is now able to drink coffee with L hand.    Objective   PROM elbow extension 30deg shy of extension (post long duration stretch)    See Exercise, Manual, and Modality Logs for complete treatment.       Assessment/Plan  Good tolerance to passive long duration loaded stretch. Elbow ROM continues to progress slowly. Shoulder PROM most limited in IR. Will benefit from further L UE strengthening as range improves.               Manual Therapy:    25     mins  65434;  Therapeutic Exercise:    5     mins  03080;     Neuromuscular Javier:    0    mins  99051;    Therapeutic Activity:     0     mins  86877;     Gait Trainin     mins  27818;     Ultrasound:     0     mins  39771;    Work Hardening           0      mins 14582  Iontophoresis               0   mins 62313    Timed Treatment:   30   mins   Total Treatment:     35   mins    Gege Enrique, PT  Physical Therapist

## 2017-08-28 ENCOUNTER — TREATMENT (OUTPATIENT)
Dept: PHYSICAL THERAPY | Facility: CLINIC | Age: 47
End: 2017-08-28

## 2017-08-28 DIAGNOSIS — Z98.890 HISTORY OF ELBOW SURGERY: Primary | ICD-10-CM

## 2017-08-28 PROCEDURE — 97110 THERAPEUTIC EXERCISES: CPT | Performed by: PHYSICAL THERAPIST

## 2017-08-28 PROCEDURE — 97140 MANUAL THERAPY 1/> REGIONS: CPT | Performed by: PHYSICAL THERAPIST

## 2017-08-28 NOTE — PROGRESS NOTES
Physical Therapy Daily Progress Note    Subjective   Pt reports he is able to do tasks with increased ease ie. pouring shampoo with left hand.    Objective   See Exercise, Manual, and Modality Logs for complete treatment.       Assessment/Plan  Pt had improved elbow ROM w/ PROM in various shoulder and wrist positions. Limitations in wrist flexion persist. Good tolerance to therapeutic exercise.               Manual Therapy:    30     mins  84435;  Therapeutic Exercise:    10     mins  96359;     Neuromuscular Javier:    0    mins  54336;    Therapeutic Activity:     0     mins  45742;     Gait Trainin     mins  83220;     Ultrasound:     0     mins  95605;    Work Hardening           0      mins 31712  Iontophoresis               0   mins 22250    Timed Treatment:   40   mins   Total Treatment:     45   mins    Gege Enrique, PT  Physical Therapist

## 2017-08-30 ENCOUNTER — TREATMENT (OUTPATIENT)
Dept: PHYSICAL THERAPY | Facility: CLINIC | Age: 47
End: 2017-08-30

## 2017-08-30 DIAGNOSIS — Z98.890 HISTORY OF ELBOW SURGERY: Primary | ICD-10-CM

## 2017-08-30 PROCEDURE — 97140 MANUAL THERAPY 1/> REGIONS: CPT | Performed by: PHYSICAL THERAPIST

## 2017-08-30 NOTE — PROGRESS NOTES
Physical Therapy Daily Progress Note    Subjective   Pt reports his shoulder is more sore today and he's not sure why. Did fall asleep on couch yesterday. Minimal impairments with wrist, hand, and fine motor strength.    Objective   See Exercise, Manual, and Modality Logs for complete treatment.       Assessment/Plan  Elbow flexion ROM progressing well. Continues to have most significant pain with PROM extension. Deferred biceps strengthening for the time being and increased emphasis on triceps activation.                Manual Therapy:    25     mins  42876;  Therapeutic Exercise:    8     mins  34453;     Neuromuscular Javier:    0    mins  89632;    Therapeutic Activity:     0     mins  91027;     Gait Trainin     mins  75345;     Ultrasound:     0     mins  31931;    Work Hardening           0      mins 91041  Iontophoresis               0   mins 94296    Timed Treatment:   33   mins   Total Treatment:     40   mins    Gege Enrique, PT  Physical Therapist

## 2017-09-01 ENCOUNTER — TREATMENT (OUTPATIENT)
Dept: PHYSICAL THERAPY | Facility: CLINIC | Age: 47
End: 2017-09-01

## 2017-09-01 PROCEDURE — PTSPMIN2 PR PHYS THER SP 16 TO 30 MINUTES: Performed by: PHYSICAL THERAPIST

## 2017-09-01 NOTE — PROGRESS NOTES
Physical Therapy Daily Progress Note    Subjective   Pt reports his forearm (medial and lateral to elbow) is sore.    Objective     Active Range of Motion     Left Elbow   Flexion: 130 degrees   Extension: 150 degrees with pain     See Exercise, Manual, and Modality Logs for complete treatment.       Assessment/Plan  Pt has made minimal improvements in elbow extension over the past several visits. Elbow flexion is improving steadily. Minimal pain w/ ADLs; pain persists with stretching.               Manual Therapy:    20     mins  19522;  Therapeutic Exercise:    10     mins  29819;     Neuromuscular Javier:    0    mins  58254;    Therapeutic Activity:     0     mins  51064;     Gait Trainin     mins  87794;     Ultrasound:     0     mins  46895;    Work Hardening           0      mins 80400  Iontophoresis               0   mins 76234    Timed Treatment:   30   mins   Total Treatment:     35   mins    Gege Enrique, PT  Physical Therapist

## 2017-09-05 ENCOUNTER — TREATMENT (OUTPATIENT)
Dept: PHYSICAL THERAPY | Facility: CLINIC | Age: 47
End: 2017-09-05

## 2017-09-05 DIAGNOSIS — Z98.890 HISTORY OF ELBOW SURGERY: Primary | ICD-10-CM

## 2017-09-05 PROCEDURE — 97110 THERAPEUTIC EXERCISES: CPT | Performed by: PHYSICAL THERAPIST

## 2017-09-05 PROCEDURE — 97140 MANUAL THERAPY 1/> REGIONS: CPT | Performed by: PHYSICAL THERAPIST

## 2017-09-05 NOTE — PROGRESS NOTES
Physical Therapy Daily Progress Note    Subjective   Pt reports no new changes. He is able to reach into shirt pocket with L hand and touch shoulder with some pain.    Objective     Active Range of Motion     Left Elbow   Flexion: 130 degrees with pain  Extension: with pain  Forearm supination: WFL  Forearm pronation: WFL    Additional Active Range of Motion Details  33deg away from neutral extension    Passive Range of Motion   Left Shoulder   Normal passive range of motion    Left Wrist   Wrist flexion: with pain  Wrist extension: WFL    Additional Passive Range of Motion Details  Mild limitation in L wrist flexion     See Exercise, Manual, and Modality Logs for complete treatment.       Assessment/Plan  Elbow flexion continues to improve with each visit. Significant limitations in elbow extension persist with moderate to severe pain at end range. Advised pt to perform passive loaded stretch in extension at least 1x/day for ~10 mins. Discussed possibility of using dynasplint for extension. Will contact Dr. Davila re:splint.               Manual Therapy:    25     mins  35020;  Therapeutic Exercise:    15     mins  30470;     Neuromuscular Javier:    0    mins  92221;    Therapeutic Activity:     0     mins  72722;     Gait Trainin     mins  65337;     Ultrasound:     0     mins  75057;    Work Hardening           0      mins 16908  Iontophoresis               0   mins 27032    Timed Treatment:   40   mins   Total Treatment:     40   mins    Gege Enrique, PT  Physical Therapist

## 2017-09-06 ENCOUNTER — OFFICE VISIT (OUTPATIENT)
Dept: ORTHOPEDIC SURGERY | Facility: CLINIC | Age: 47
End: 2017-09-06

## 2017-09-06 VITALS — WEIGHT: 205 LBS | TEMPERATURE: 98.7 F | HEIGHT: 68 IN | BODY MASS INDEX: 31.07 KG/M2

## 2017-09-06 DIAGNOSIS — Z98.890 HISTORY OF ELBOW SURGERY: Primary | ICD-10-CM

## 2017-09-06 PROCEDURE — 99024 POSTOP FOLLOW-UP VISIT: CPT | Performed by: ORTHOPAEDIC SURGERY

## 2017-09-06 PROCEDURE — 73070 X-RAY EXAM OF ELBOW: CPT | Performed by: ORTHOPAEDIC SURGERY

## 2017-09-06 NOTE — PROGRESS NOTES
[facility], [place]    Brief Operative Note    Pre-operative diagnosis: Anal Dysplasia   Post-operative diagnosis Anal dysplasia  Procedure: Procedure(s):  Examination Under anesthesia, Anal Microscopy With Biopsies, fulguration    - Wound Class: II-Clean Contaminated  Surgeon: Surgeon(s) and Role:     * Sarbjit Mai MD - Primary     * Ranjit Foster MD - Assisting  Anesthesia: Monitor Anesthesia Care   Estimated blood loss: None  Drains: None  Specimens:   ID Type Source Tests Collected by Time Destination   A : Anal Ulcer  Tissue Anus SURGICAL PATHOLOGY EXAM Sarbjit Mai MD 8/30/2017  8:46 AM      Findings:   AIN on anoscopy  Complications: None.  Implants: None.         Mr. Villaseñor comes in today for follow up of the left elbow.  He tells me it is doing well.  He is very happy with his progress.  He denies any pain.  His 1 complaint is that he is still struggling to regain extension.  He has not had any further instability.    His incision is healed.  Motion is from 35° shy of full extension to 135° of flexion as measured with a goniometer.  Full pronation and supination.  No instability.  Good motor and sensory function in his hand.    AP and lateral views of the left elbow are ordered and reviewed.  These are compared to previous x-rays.  He has some heterotopic ossification anteriorly.  The elbow appears well aligned.  The implant appears well fixed.    Assessment: 2.5 months status post left radial head replacement and lateral collateral ligament repair    Plan: He seems to be doing well.  I have given him a prescription for a Dynasplint to work on extension.  I will see him back in another 3 months.  We discussed appropriate activity modifications and restrictions in the interim.

## 2017-09-08 ENCOUNTER — TREATMENT (OUTPATIENT)
Dept: PHYSICAL THERAPY | Facility: CLINIC | Age: 47
End: 2017-09-08

## 2017-09-08 DIAGNOSIS — Z98.890 HISTORY OF ELBOW SURGERY: Primary | ICD-10-CM

## 2017-09-08 PROCEDURE — 97110 THERAPEUTIC EXERCISES: CPT | Performed by: PHYSICAL THERAPIST

## 2017-09-08 PROCEDURE — 97140 MANUAL THERAPY 1/> REGIONS: CPT | Performed by: PHYSICAL THERAPIST

## 2017-09-08 NOTE — PROGRESS NOTES
Physical Therapy Daily Progress Note    Subjective   Pt reports he has heard from [employer] rep and will be fitted next week. Instructed to wear splint 0x17xzon/day. Feels that his shoulder was getting stronger but has plateaud.     Objective   See Exercise, Manual, and Modality Logs for complete treatment.       Assessment/Plan  Continues to have pain w/ PROM extension. Flexion improving. Updated HEP to increase shoulder strengthening. Will continue to benefit from skilled PT to improve L UE AROM and strength to decrease difficulty and pain w/ ADLs.               Manual Therapy:    25     mins  32345;  Therapeutic Exercise:    15     mins  31005;     Neuromuscular Javier:    0    mins  26387;    Therapeutic Activity:     0     mins  41226;     Gait Trainin     mins  40818;     Ultrasound:     0     mins  55513;    Work Hardening           0      mins 64663  Iontophoresis               0   mins 89428    Timed Treatment:   40   mins   Total Treatment:     40   mins    Gege Enrique, PT  Physical Therapist

## 2017-09-11 ENCOUNTER — TREATMENT (OUTPATIENT)
Dept: PHYSICAL THERAPY | Facility: CLINIC | Age: 47
End: 2017-09-11

## 2017-09-11 DIAGNOSIS — Z98.890 HISTORY OF ELBOW SURGERY: Primary | ICD-10-CM

## 2017-09-11 PROCEDURE — PTSPVT PR CUSTOM PT TREATMENT OF SELF PAY PATIENT: Performed by: PHYSICAL THERAPIST

## 2017-09-11 NOTE — PROGRESS NOTES
Physical Therapy Daily Progress Note    Subjective   Pt reports that he still has intermittent pain in shoulder. Using L arm for more ADLs. Sleeping with arm extended to stretch and has done long duration loaded stretch a few times. Not sure when he will be fitted for splint.    Objective   See Exercise, Manual, and Modality Logs for complete treatment.       Assessment/Plan  Elbow flexion ROM continues to improve. Elbow extension PROM less strenuous today. Demonstrated good shoulder mechanics during strengthening. Progress per POC.               Manual Therapy:    25     mins  81760;  Therapeutic Exercise:    15     mins  98025;     Neuromuscular Javier:    0    mins  28049;    Therapeutic Activity:     0     mins  39391;     Gait Trainin     mins  42908;     Ultrasound:     0     mins  01506;    Work Hardening           0      mins 97854  Iontophoresis               0   mins 99679    Timed Treatment:   40   mins   Total Treatment:     45   mins    Gege Enrique, PT  Physical Therapist

## 2017-09-13 ENCOUNTER — TREATMENT (OUTPATIENT)
Dept: PHYSICAL THERAPY | Facility: CLINIC | Age: 47
End: 2017-09-13

## 2017-09-13 DIAGNOSIS — Z98.890 HISTORY OF ELBOW SURGERY: Primary | ICD-10-CM

## 2017-09-13 PROCEDURE — 97110 THERAPEUTIC EXERCISES: CPT | Performed by: PHYSICAL THERAPIST

## 2017-09-13 PROCEDURE — 97140 MANUAL THERAPY 1/> REGIONS: CPT | Performed by: PHYSICAL THERAPIST

## 2017-09-13 NOTE — PROGRESS NOTES
Re-Assessment / Re-Certification        Patient: Roni Villaseñor   : 1970  Diagnosis/ICD-10 Code:  History of elbow surgery [Z98.890]  Referring practitioner: Dmitry Davila MD  Date of Initial Visit: 2017  Today's Date: 2017  Patient seen for 17 sessions      Subjective:   Roni Villaseñor reports: He is continuing to notice improvements in ROM with ADLs. Able to sleep at night with arm under pillow. Able to lay with arm extended with significantly less discomfort. Still having pain in shoulder.  Clinical Progress: improved  Home Program Compliance: Yes  Treatment has included: therapeutic exercise, manual therapy and cryotherapy    Subjective     Objective     Active Range of Motion     Left Elbow   Flexion: 130 degrees     Additional Active Range of Motion Details  30deg shy of full extension  Assessment/Plan  Elbow flexion is consistently improving. Elbow extension ROM significantly limited; pt was fitted for extension Dynasplint today. L UE strength continues to improve along with ROM. Pain is minimal w/ ADLs; increased with activity at end range motion. Will continue to benefit from skilled PT to improve functional mobility and strength of L UE, so that patient may return to PLOF.      Progress toward previous goals: Partially Met    New Goals  Long-term goals (LTG): 4-6 weeks  1. Exhibit (L) elbow AROM to WFL to allow for ADLs without pain limiting function.  2. Demonstrate improved Left UE MMT of >/= 4+/5 to allow for performance of ADL's/household management/recreational activities.  3. Demonstrate improved Left UE  >/= R to allow for performance of ADL's/household management/recreational activities.  4. Report perceived disability </=10% based on QuickDASH       Recommendations: Continue as planned 3x/week  Timeframe: 6 weeks  Prognosis to achieve goals: good    PT Signature: Gege Enrique, PT      Based upon review of the patient's progress and continued therapy plan, it is my medical opinion  that Roni Villaseñor should continue physical therapy treatment at [facility]  [address] [address] [phone].    Signature: __________________________________  Dmitry Davila MD    Manual Therapy:    25     mins  53460;  Therapeutic Exercise:    20     mins  29749;     Neuromuscular Javier:    0    mins  52400;    Therapeutic Activity:     0     mins  57463;     Gait Trainin     mins  28329;     Ultrasound:     0     mins  73469;    Work Hardening           0      mins 63977  Iontophoresis               0   mins 59750    Timed Treatment:   45   mins   Total Treatment:     50   mins

## 2017-09-18 ENCOUNTER — TREATMENT (OUTPATIENT)
Dept: PHYSICAL THERAPY | Facility: CLINIC | Age: 47
End: 2017-09-18

## 2017-09-18 DIAGNOSIS — Z98.890 HISTORY OF ELBOW SURGERY: Primary | ICD-10-CM

## 2017-09-18 PROCEDURE — 97110 THERAPEUTIC EXERCISES: CPT | Performed by: PHYSICAL THERAPIST

## 2017-09-18 PROCEDURE — 97140 MANUAL THERAPY 1/> REGIONS: CPT | Performed by: PHYSICAL THERAPIST

## 2017-09-18 NOTE — PROGRESS NOTES
Physical Therapy Daily Progress Note      Subjective   Pt reports he is able to wear dynasplint 2x per day but has difficulty finding a 3rd time to wear it. He is able to sleep with arm flat on bed now. Elbow does not appear more straight but feels more straight.    Objective     Passive Range of Motion     Additional Passive Range of Motion Details  25 deg shy of full extension    Strength/Myotome Testing     Left Shoulder     Planes of Motion   Flexion: 5   Abduction: 4+   External rotation at 0°: 4   Internal rotation at 0°: 4     Left Elbow   Flexion: 5  Forearm supination: 5    Left Wrist/Hand      (2nd hand position)     Trial 1: 65    Right Wrist/Hand      (2nd hand position)     Trial 1: 120     See Exercise, Manual, and Modality Logs for complete treatment.       Assessment/Plan  5 degree improvement in PROM elbow extension. AROM elbow flexion progressing well. Limitations in R UE  and shoulder strength persist. Good tolerance to strengthening progressions. Suggested pt utilize splint when laying down in bed at night prior to falling asleep to achieve 3 stretches per day. Progress per POC.                 Manual Therapy:    25     mins  13290;  Therapeutic Exercise:    15     mins  25052;     Neuromuscular Javier:    0    mins  85219;    Therapeutic Activity:     0     mins  10295;     Gait Trainin     mins  95943;     Ultrasound:     0     mins  45744;    Work Hardening           0      mins 04924  Iontophoresis               0   mins 45028    Timed Treatment:   40   mins   Total Treatment:     40   mins    Gege Enrique, PT  Physical Therapist

## 2017-09-22 ENCOUNTER — TREATMENT (OUTPATIENT)
Dept: PHYSICAL THERAPY | Facility: CLINIC | Age: 47
End: 2017-09-22

## 2017-09-22 DIAGNOSIS — Z98.890 HISTORY OF ELBOW SURGERY: Primary | ICD-10-CM

## 2017-09-22 PROCEDURE — 97140 MANUAL THERAPY 1/> REGIONS: CPT | Performed by: PHYSICAL THERAPIST

## 2017-09-22 PROCEDURE — 97110 THERAPEUTIC EXERCISES: CPT | Performed by: PHYSICAL THERAPIST

## 2017-09-22 NOTE — PROGRESS NOTES
Physical Therapy Daily Progress Note    Subjective   Pt reports he was able to reach with L hand to grab seatbelt with only minor strain.    Objective   See Exercise, Manual, and Modality Logs for complete treatment.       Assessment/Plan  Elbow flexion ROM continues to progress. Decreased pain and tremors w/ PROM elbow extension. Shoulder mobility WNL, however, pt continues to have pain in anterior shoulder w/ end range abduction. Added latissimus stretch to HEP. Will continue to benefit from skilled PT to improve L UE ROM and functional strength.                 Manual Therapy:    25     mins  76156;  Therapeutic Exercise:    20     mins  28788;     Neuromuscular Javier:    0    mins  70809;    Therapeutic Activity:     0     mins  85709;     Gait Trainin     mins  65530;     Ultrasound:     0     mins  17344;    Work Hardening           0      mins 76548  Iontophoresis               0   mins 98595    Timed Treatment:   45   mins   Total Treatment:     45   mins    Gege Enrique PT  Physical Therapist

## 2017-09-25 ENCOUNTER — TREATMENT (OUTPATIENT)
Dept: PHYSICAL THERAPY | Facility: CLINIC | Age: 47
End: 2017-09-25

## 2017-09-25 DIAGNOSIS — Z98.890 HISTORY OF ELBOW SURGERY: Primary | ICD-10-CM

## 2017-09-25 PROCEDURE — 97140 MANUAL THERAPY 1/> REGIONS: CPT | Performed by: PHYSICAL THERAPIST

## 2017-09-25 PROCEDURE — 97110 THERAPEUTIC EXERCISES: CPT | Performed by: PHYSICAL THERAPIST

## 2017-09-25 NOTE — PROGRESS NOTES
Physical Therapy Daily Progress Note    Subjective   Pt reports elbow is at about the same resting angle (lacking extension), but he feels he can push it into more extension than he could before splint.    Objective   Neg Rayray HUTTON  Neg Telma HUTTON    See Exercise, Manual, and Modality Logs for complete treatment.       Assessment/Plan  Negative special testing for L shoulder impingement. Good tolerance to increase in shoulder stabilization. Elbow ROM continues to progress slowly. Progress per POC.                 Manual Therapy:    25     mins  84544;  Therapeutic Exercise:    20     mins  28011;     Neuromuscular Javier:    0    mins  24277;    Therapeutic Activity:     0     mins  52353;     Gait Trainin     mins  96352;     Ultrasound:     0     mins  67913;    Work Hardening           0      mins 46589  Iontophoresis               0   mins 94745    Timed Treatment:   45   mins   Total Treatment:     50   mins    Gege Enrique, PT  Physical Therapist

## 2017-09-27 ENCOUNTER — TREATMENT (OUTPATIENT)
Dept: PHYSICAL THERAPY | Facility: CLINIC | Age: 47
End: 2017-09-27

## 2017-09-27 DIAGNOSIS — Z98.890 HISTORY OF ELBOW SURGERY: Primary | ICD-10-CM

## 2017-09-27 PROCEDURE — PTSPMIN2 PR PHYS THER SP 16 TO 30 MINUTES: Performed by: PHYSICAL THERAPIST

## 2017-09-27 NOTE — PROGRESS NOTES
Physical Therapy Daily Progress Note    Subjective   Slept with arm overhead last night and is more sore this morning.    Objective     Active Range of Motion     Additional Active Range of Motion Details  Lacking 26 degrees extension    Passive Range of Motion     Additional Passive Range of Motion Details  Lacking 18 degrees extension       See Exercise, Manual, and Modality Logs for complete treatment.       Assessment/Plan  Elbow extension ROM is improving. Pt continues to have pain w/ L shoulder PROM and latissimus/subscapularis tightness. Able to progress L UE strengthening w/o increased pain. Progress per POC.                 Manual Therapy:    20     mins  44152;  Therapeutic Exercise:    10     mins  76956;     Neuromuscular Javier:    0    mins  74227;    Therapeutic Activity:     0     mins  41552;     Gait Trainin     mins  93820;     Ultrasound:     0     mins  70251;    Work Hardening           0      mins 13506  Iontophoresis               0   mins 34609    Timed Treatment:   30   mins   Total Treatment:     45   mins    Gege Enrique PT  Physical Therapist

## 2017-10-02 ENCOUNTER — TREATMENT (OUTPATIENT)
Dept: PHYSICAL THERAPY | Facility: CLINIC | Age: 47
End: 2017-10-02

## 2017-10-02 DIAGNOSIS — Z98.890 HISTORY OF ELBOW SURGERY: Primary | ICD-10-CM

## 2017-10-02 PROCEDURE — 97110 THERAPEUTIC EXERCISES: CPT | Performed by: PHYSICAL THERAPIST

## 2017-10-02 PROCEDURE — 97140 MANUAL THERAPY 1/> REGIONS: CPT | Performed by: PHYSICAL THERAPIST

## 2017-10-02 NOTE — PROGRESS NOTES
Physical Therapy Daily Progress Note    Subjective   Pt reports no new changes. Shoulder feels weak lifting coffee cup to drink.    Objective   See Exercise, Manual, and Modality Logs for complete treatment.       Assessment/Plan  L elbow flexion near normal limits with slight resistance at end range. L elbow flexion continues to improve and responds well to contract relax stretching technique. Good tolerance to L UE strengthening. Progress per POC.                 Manual Therapy:    20     mins  03211;  Therapeutic Exercise:    25     mins  95255;     Neuromuscular Javier:    0    mins  24503;    Therapeutic Activity:     0     mins  20931;     Gait Trainin     mins  48197;     Ultrasound:     0     mins  15074;    Work Hardening           0      mins 56455  Iontophoresis               0   mins 45824    Timed Treatment:   45   mins   Total Treatment:     50   mins    Gege Enrique, PT  Physical Therapist

## 2017-10-04 ENCOUNTER — TREATMENT (OUTPATIENT)
Dept: PHYSICAL THERAPY | Facility: CLINIC | Age: 47
End: 2017-10-04

## 2017-10-04 DIAGNOSIS — Z98.890 HISTORY OF ELBOW SURGERY: Primary | ICD-10-CM

## 2017-10-04 PROCEDURE — PTSPMIN2 PR PHYS THER SP 16 TO 30 MINUTES: Performed by: PHYSICAL THERAPIST

## 2017-10-04 NOTE — PROGRESS NOTES
Physical Therapy Daily Progress Note    Subjective   Pt reports no new changes.     Objective     Active Range of Motion   Left Shoulder   Flexion: WFL  Abduction: WFL  External rotation BTH: C2   Internal rotation BTB: L1     Left Elbow   Flexion: 134 degrees     Additional Active Range of Motion Details  25deg shy of extension     See Exercise, Manual, and Modality Logs for complete treatment.       Assessment/Plan  Pt is able to demonstrate L shoulder AROM WNL pain-free. L elbow ROM is progressing slowly with less pain at end range. Good tolerance to new strengthening. Progress per POC..         Manual Therapy:    15     mins  49352;  Therapeutic Exercise:    15     mins  98260;     Neuromuscular Javier:    0    mins  03333;    Therapeutic Activity:     0     mins  11885;     Gait Trainin     mins  60953;     Ultrasound:     0     mins  30355;    Work Hardening           0      mins 97040  Iontophoresis               0   mins 91539    Timed Treatment:   30   mins   Total Treatment:     60   mins    Gege Enrique, PT  Physical Therapist

## 2017-10-06 ENCOUNTER — TREATMENT (OUTPATIENT)
Dept: PHYSICAL THERAPY | Facility: CLINIC | Age: 47
End: 2017-10-06

## 2017-10-06 DIAGNOSIS — Z98.890 HISTORY OF ELBOW SURGERY: Primary | ICD-10-CM

## 2017-10-06 PROCEDURE — PTSPMIN2 PR PHYS THER SP 16 TO 30 MINUTES: Performed by: PHYSICAL THERAPIST

## 2017-10-06 NOTE — PROGRESS NOTES
Physical Therapy Daily Progress Note    Subjective   Pt reports right arm is sore from increase in bilateral exercises. Wore splint x3 yesterday.    Objective   See Exercise, Manual, and Modality Logs for complete treatment.       Assessment/Plan  Pt is able to demonstrate L elbow flexion WNL with tightness at end range. Elbow strength continues to progress. Remains most limited in elbow extension. Progress per POC.                 Manual Therapy:    15     mins  92077;  Therapeutic Exercise:    15     mins  31110;     Neuromuscular Javier:    0    mins  24818;    Therapeutic Activity:     0     mins  55453;     Gait Trainin     mins  57842;     Ultrasound:     0     mins  99179;    Work Hardening           0      mins 62039  Iontophoresis               0   mins 34571    Timed Treatment:   30   mins   Total Treatment:     50   mins    Gege Enrique, PT  Physical Therapist

## 2017-10-09 ENCOUNTER — TREATMENT (OUTPATIENT)
Dept: PHYSICAL THERAPY | Facility: CLINIC | Age: 47
End: 2017-10-09

## 2017-10-09 DIAGNOSIS — Z98.890 HISTORY OF ELBOW SURGERY: Primary | ICD-10-CM

## 2017-10-09 PROCEDURE — PTSPMIN2 PR PHYS THER SP 16 TO 30 MINUTES: Performed by: PHYSICAL THERAPIST

## 2017-10-09 NOTE — PROGRESS NOTES
Physical Therapy Daily Progress Note    Subjective   Pt reports no new changes.    Objective   See Exercise, Manual, and Modality Logs for complete treatment.       Assessment/Plan  Pt has limited posterior capsule mobility of L shoulder. Added sleeper stretch to HEP. L elbow ROM continues to progress. Good tolerance to therapeutic exercise. Progress per POC.                 Manual Therapy:    15     mins  56942;  Therapeutic Exercise:    17     mins  24569;     Neuromuscular Javier:    0    mins  43381;    Therapeutic Activity:     0     mins  42277;     Gait Trainin     mins  98271;     Ultrasound:     0     mins  95937;    Work Hardening           0      mins 53669  Iontophoresis               0   mins 11162    Timed Treatment:   32   mins   Total Treatment:     50   mins    Gege Enrique, PT  Physical Therapist

## 2017-10-13 ENCOUNTER — TREATMENT (OUTPATIENT)
Dept: PHYSICAL THERAPY | Facility: CLINIC | Age: 47
End: 2017-10-13

## 2017-10-13 DIAGNOSIS — Z98.890 HISTORY OF ELBOW SURGERY: Primary | ICD-10-CM

## 2017-10-13 PROCEDURE — PTSPMIN2 PR PHYS THER SP 16 TO 30 MINUTES: Performed by: PHYSICAL THERAPIST

## 2017-10-13 NOTE — PROGRESS NOTES
Physical Therapy Daily Progress Note    Subjective   Pt reports no new changes.    Objective   L elbow AROM FLEX 130  L elbow AROM EXT lacking 28    PROM EXT lacking 20    See Exercise, Manual, and Modality Logs for complete treatment.       Assessment/Plan   L elbow ROM continues to progress slowly and patient continues to reports improvements with ADLs. Good tolerance to progressions in therapeutic exercise. Will continue to benefit from skilled PT to progress elbow ROM, elbow strength, shoulder mobility, and shoulder stability.                   Manual Therapy:    20     mins  96776;  Therapeutic Exercise:    10     mins  11334;     Neuromuscular Javier:    0    mins  27471;    Therapeutic Activity:     0     mins  33366;     Gait Trainin     mins  84096;     Ultrasound:     0     mins  34973;    Work Hardening           0      mins 43315  Iontophoresis               0   mins 27384    Timed Treatment:   30   mins   Total Treatment:     50   mins    Gege Enrique, PT  Physical Therapist

## 2017-10-24 ENCOUNTER — TREATMENT (OUTPATIENT)
Dept: PHYSICAL THERAPY | Facility: CLINIC | Age: 47
End: 2017-10-24

## 2017-10-24 DIAGNOSIS — Z98.890 HISTORY OF ELBOW SURGERY: Primary | ICD-10-CM

## 2017-10-24 PROCEDURE — PTSPMIN2 PR PHYS THER SP 16 TO 30 MINUTES: Performed by: PHYSICAL THERAPIST

## 2017-10-24 NOTE — PROGRESS NOTES
Physical Therapy Daily Progress Note    Subjective   Has splint at level 9/12 and elbow is sore afterwards. Shoulder not as sore anymore.     Objective  See Exercise, Manual, and Modality Logs for complete treatment.       Assessment/Plan   Continues to lack approximately 30deg at rest. Able to achieve 10deg shy of full extension with PROM. Updated HEP to include recreational activity specific exercise (fris[employer]e golf).                 Manual Therapy:    20     mins  09673;  Therapeutic Exercise:    10     mins  05097;     Neuromuscular Javier:    0    mins  49501;    Therapeutic Activity:     0     mins  21917;     Gait Trainin     mins  16410;     Ultrasound:     0     mins  57573;    Work Hardening           0      mins 27200  Iontophoresis               0   mins 75823    Timed Treatment:   30   mins   Total Treatment:     45   mins    Gege Enrique PT  Physical Therapist

## 2017-10-26 ENCOUNTER — TREATMENT (OUTPATIENT)
Dept: PHYSICAL THERAPY | Facility: CLINIC | Age: 47
End: 2017-10-26

## 2017-10-26 DIAGNOSIS — Z98.890 HISTORY OF ELBOW SURGERY: Primary | ICD-10-CM

## 2017-10-26 PROCEDURE — PTSPMIN2 PR PHYS THER SP 16 TO 30 MINUTES: Performed by: PHYSICAL THERAPIST

## 2017-10-26 NOTE — PROGRESS NOTES
Physical Therapy Daily Progress Note    Subjective   Pt reports no new changes.    Objective   See Exercise, Manual, and Modality Logs for complete treatment.       Assessment/Plan  Significantly improved elbow extension w/ arm nearly flat on table supine. Continues to have increased tightness and pain with end range supination. Reminded patient to continue supination stretch as well as prone elbow flexion.               Manual Therapy:    23     mins  02259;  Therapeutic Exercise:    5     mins  13067;     Neuromuscular Javier:    0    mins  77483;    Therapeutic Activity:     0     mins  12765;     Gait Trainin     mins  05290;     Ultrasound:     0     mins  69287;    Work Hardening           0      mins 30111  Iontophoresis               0   mins 36852    Timed Treatment:   28   mins   Total Treatment:     34   mins    Gege Enrique, PT  Physical Therapist

## 2017-11-09 ENCOUNTER — TREATMENT (OUTPATIENT)
Dept: PHYSICAL THERAPY | Facility: CLINIC | Age: 47
End: 2017-11-09

## 2017-11-09 DIAGNOSIS — Z98.890 HISTORY OF ELBOW SURGERY: Primary | ICD-10-CM

## 2017-11-09 PROCEDURE — PTSPMIN2 PR PHYS THER SP 16 TO 30 MINUTES: Performed by: PHYSICAL THERAPIST

## 2017-11-09 NOTE — PROGRESS NOTES
Physical Therapy Daily Progress Note    Subjective   Pt reports elbow has been more sore lately, but he has been doing more with the elbow. ROM seems about the same. Noticed he was steering with L arm which he has not done in a while.    Objective       Active Range of Motion   Left Shoulder   Flexion: 142 degrees   Abduction: 145 degrees   External rotation BTH: T1   Internal rotation BTB: L2     Right Shoulder   Flexion: 154 degrees   Abduction: 145 degrees   External rotation BTH: T2   Internal rotation BTB: L1     Left Elbow   Flexion: 130 degrees     Additional Active Range of Motion Details  Lacking 28deg extension    Strength/Myotome Testing     Left Shoulder     Planes of Motion   Flexion: 5   Abduction: 4+   External rotation at 0°: 5   Internal rotation at 0°: 5     Isolated Muscles   Biceps: 5   Triceps: 4+     Right Shoulder     Planes of Motion   Flexion: 5   Abduction: 5   External rotation at 0°: 5   Internal rotation at 0°: 5     Isolated Muscles   Biceps: 5   Triceps: 5      See Exercise, Manual, and Modality Logs for complete treatment.       Assessment/Plan  Mild limitations in L shoulder AROM persist w/ discomfort at end range. Good L elbow flexion AROM. Minimal progress in L elbow extension AROM. Updated HEP to highlight key stretching and strengthening. Progress per POC.                 Manual Therapy:    25     mins  36470;  Therapeutic Exercise:    10     mins  10605;     Neuromuscular Javier:    0    mins  14653;    Therapeutic Activity:     0     mins  24970;     Gait Trainin     mins  08390;     Ultrasound:     0     mins  86564;    Work Hardening           0      mins 48166  Iontophoresis               0   mins 04207    Timed Treatment:   35   mins   Total Treatment:     50   mins    Gege Enrique, PT  Physical Therapist

## 2017-11-28 ENCOUNTER — TREATMENT (OUTPATIENT)
Dept: PHYSICAL THERAPY | Facility: CLINIC | Age: 47
End: 2017-11-28

## 2017-11-28 DIAGNOSIS — Z98.890 HISTORY OF ELBOW SURGERY: Primary | ICD-10-CM

## 2017-11-28 PROCEDURE — PTSPMIN2 PR PHYS THER SP 16 TO 30 MINUTES: Performed by: PHYSICAL THERAPIST

## 2017-11-28 NOTE — PROGRESS NOTES
Physical Therapy Daily Progress Note    Subjective   Pt reports he no longer needs to sleep with extra pillows for comfort. No significant limitations in ADLs. Extension splint still feels useful; he is sore afterwards.    Objective       Strength/Myotome Testing     Left Shoulder     Planes of Motion   Flexion: 5   Abduction: 5   External rotation at 0°: 5   Internal rotation at 0°: 5     Left Elbow   Flexion: 5  Extension: 5    Left Wrist/Hand      (2nd hand position)     Trial 1: 88 lbs    Right Wrist/Hand      (2nd hand position)     Trial 1: 104 lbs     See Exercise, Manual, and Modality Logs for complete treatment.       Assessment/Plan  Continues to have limitations in overhead motion, elbow extension, and shoulder capsule mobility. Demonstrated significant improvements in L UE strength. Reviewed HEP and posture. Plan to follow up in 3 weeks prior to pt going OOT. Progress per POC.                 Manual Therapy:    15     mins  14520;  Therapeutic Exercise:    10     mins  49516;     Neuromuscular Javier:    0    mins  54036;    Therapeutic Activity:     0     mins  13022;     Gait Trainin     mins  35744;     Ultrasound:     0     mins  87599;    Work Hardening           0      mins 00111  Iontophoresis               0   mins 37205    Timed Treatment:   25   mins   Total Treatment:     35   mins    Gege Enrique, PT  Physical Therapist

## 2017-12-06 ENCOUNTER — OFFICE VISIT (OUTPATIENT)
Dept: ORTHOPEDIC SURGERY | Facility: CLINIC | Age: 47
End: 2017-12-06

## 2017-12-06 VITALS — BODY MASS INDEX: 30.31 KG/M2 | WEIGHT: 200 LBS | HEIGHT: 68 IN

## 2017-12-06 DIAGNOSIS — Z98.890 HISTORY OF ELBOW SURGERY: Primary | ICD-10-CM

## 2017-12-06 PROCEDURE — 99212 OFFICE O/P EST SF 10 MIN: CPT | Performed by: ORTHOPAEDIC SURGERY

## 2017-12-06 PROCEDURE — 73070 X-RAY EXAM OF ELBOW: CPT | Performed by: ORTHOPAEDIC SURGERY

## 2017-12-06 NOTE — PROGRESS NOTES
Chief Complaint:  Follow up s/p radial head arthroplasty and LCL repair    HPI:  Mr. Villaseñor comes in for follow-up of the left elbow.  He says it is doing great.  He is not having any pain.  His only complaint is that he has not quite recovered full extension.  Denies any further instability.  He tells me that he is back to essentially normal activity and function at this point.    Exam:  Left elbow is examined.  His incision is healed.  I measured his motion with a goniometer.  He lacks 30° of terminal extension and can flex to 135°.  Pronation and supination are nearly symmetric to the contralateral side.  No evident instability.  Good strength with pushing and pulling against resistance.  No pain on exam.    Imaging:  AP and lateral views left elbow are ordered and reviewed.  These are compared to previous x-rays.  Alignment appears unchanged.  Prosthesis appears well fixed.  Heterotopic ossification is stable at this point.    Assessment:  6 months s/p left radial head arthroplasty and LCL repair    Plan:  He seems to be doing well.  I counseled him about appropriate activity modifications and restrictions going forward.  I will see him back on an as-needed basis.  I have encouraged him to continue working on his therapy exercises in the hope that he may regain more extension but his extension is certainly functional as is.    Dmitry Davila MD  12/06/2017

## 2017-12-19 ENCOUNTER — TREATMENT (OUTPATIENT)
Dept: PHYSICAL THERAPY | Facility: CLINIC | Age: 47
End: 2017-12-19

## 2017-12-19 DIAGNOSIS — Z98.890 HISTORY OF ELBOW SURGERY: Primary | ICD-10-CM

## 2017-12-19 PROCEDURE — PTSPMIN2 PR PHYS THER SP 16 TO 30 MINUTES: Performed by: PHYSICAL THERAPIST

## 2017-12-19 NOTE — PROGRESS NOTES
Physical Therapy Daily Progress Note    Subjective   Pt reports he is doing well overall. No significant limitations w/ ADLs. Notices some tightness with bringing water to face when washing face in sink. No longer utilizing splint.    Objective   L elbow lacking 18deg extension at rest  See Exercise, Manual, and Modality Logs for complete treatment.       Assessment/Plan  Significantly improved elbow extension ROM over the course of the last month. Elbow flexion WNL. Pt continues to have mild limitations in shoulder mobility overhead. Reviewed HEP. Pt reports no significant limitations in ADLs and has demonstrated compliance with HEP. Plan to DC to this time.               Manual Therapy:    30     mins  82736;  Therapeutic Exercise:    0     mins  39907;     Neuromuscular Javier:    0    mins  95400;    Therapeutic Activity:     0     mins  62051;     Gait Trainin     mins  75806;     Ultrasound:     0     mins  75210;    Work Hardening           0      mins 38542  Iontophoresis               0   mins 19663    Timed Treatment:   30   mins   Total Treatment:     45   mins    Gege Enrique, PT  Physical Therapist

## 2018-09-04 ENCOUNTER — DOCUMENTATION (OUTPATIENT)
Dept: PHYSICAL THERAPY | Facility: CLINIC | Age: 48
End: 2018-09-04

## 2019-11-07 ENCOUNTER — APPOINTMENT (OUTPATIENT)
Dept: CT IMAGING | Facility: HOSPITAL | Age: 49
End: 2019-11-07

## 2019-11-07 ENCOUNTER — HOSPITAL ENCOUNTER (OUTPATIENT)
Facility: HOSPITAL | Age: 49
Setting detail: OBSERVATION
Discharge: HOME | End: 2019-11-08
Attending: EMERGENCY MEDICINE | Admitting: INTERNAL MEDICINE

## 2019-11-07 ENCOUNTER — OFFICE VISIT (OUTPATIENT)
Dept: FAMILY MEDICINE CLINIC | Facility: CLINIC | Age: 49
End: 2019-11-07

## 2019-11-07 ENCOUNTER — APPOINTMENT (OUTPATIENT)
Dept: MRI IMAGING | Facility: HOSPITAL | Age: 49
End: 2019-11-07

## 2019-11-07 ENCOUNTER — APPOINTMENT (OUTPATIENT)
Dept: GENERAL RADIOLOGY | Facility: HOSPITAL | Age: 49
End: 2019-11-07

## 2019-11-07 VITALS
TEMPERATURE: 98.6 F | HEIGHT: 68 IN | DIASTOLIC BLOOD PRESSURE: 80 MMHG | BODY MASS INDEX: 30.31 KG/M2 | HEART RATE: 114 BPM | OXYGEN SATURATION: 95 % | SYSTOLIC BLOOD PRESSURE: 150 MMHG | WEIGHT: 200 LBS

## 2019-11-07 DIAGNOSIS — R20.0 NUMBNESS AND TINGLING OF RIGHT ARM: ICD-10-CM

## 2019-11-07 DIAGNOSIS — Z76.89 ENCOUNTER TO ESTABLISH CARE: Primary | ICD-10-CM

## 2019-11-07 DIAGNOSIS — R20.0 RIGHT LEG NUMBNESS: ICD-10-CM

## 2019-11-07 DIAGNOSIS — R03.0 ELEVATED BP WITHOUT DIAGNOSIS OF HYPERTENSION: ICD-10-CM

## 2019-11-07 DIAGNOSIS — I10 PRIMARY HYPERTENSION: ICD-10-CM

## 2019-11-07 DIAGNOSIS — Z72.0 TOBACCO ABUSE: ICD-10-CM

## 2019-11-07 DIAGNOSIS — M54.2 NECK PAIN: ICD-10-CM

## 2019-11-07 DIAGNOSIS — R20.0 RIGHT FACIAL NUMBNESS: ICD-10-CM

## 2019-11-07 DIAGNOSIS — R94.39 ABNORMAL STRESS TEST: ICD-10-CM

## 2019-11-07 DIAGNOSIS — R20.2 NUMBNESS AND TINGLING OF RIGHT ARM: ICD-10-CM

## 2019-11-07 DIAGNOSIS — I63.9 CEREBROVASCULAR ACCIDENT (CVA), UNSPECIFIED MECHANISM (HCC): Primary | ICD-10-CM

## 2019-11-07 DIAGNOSIS — R20.0 RIGHT SIDED NUMBNESS: ICD-10-CM

## 2019-11-07 LAB
ANION GAP SERPL CALCULATED.3IONS-SCNC: 11.6 MMOL/L (ref 5–15)
BASOPHILS # BLD AUTO: 0.09 10*3/MM3 (ref 0–0.2)
BASOPHILS NFR BLD AUTO: 0.7 % (ref 0–1.5)
BUN BLD-MCNC: 14 MG/DL (ref 6–20)
BUN/CREAT SERPL: 14.1 (ref 7–25)
CALCIUM SPEC-SCNC: 9.2 MG/DL (ref 8.6–10.5)
CHLORIDE SERPL-SCNC: 104 MMOL/L (ref 98–107)
CO2 SERPL-SCNC: 27.4 MMOL/L (ref 22–29)
CREAT BLD-MCNC: 0.99 MG/DL (ref 0.76–1.27)
DEPRECATED RDW RBC AUTO: 42.1 FL (ref 37–54)
EOSINOPHIL # BLD AUTO: 0.27 10*3/MM3 (ref 0–0.4)
EOSINOPHIL NFR BLD AUTO: 2.2 % (ref 0.3–6.2)
ERYTHROCYTE [DISTWIDTH] IN BLOOD BY AUTOMATED COUNT: 12.9 % (ref 12.3–15.4)
GFR SERPL CREATININE-BSD FRML MDRD: 81 ML/MIN/1.73
GFR SERPL CREATININE-BSD FRML MDRD: 98 ML/MIN/1.73
GLUCOSE BLD-MCNC: 225 MG/DL (ref 65–99)
HCT VFR BLD AUTO: 50.5 % (ref 37.5–51)
HGB BLD-MCNC: 16.8 G/DL (ref 13–17.7)
IMM GRANULOCYTES # BLD AUTO: 0.03 10*3/MM3 (ref 0–0.05)
IMM GRANULOCYTES NFR BLD AUTO: 0.2 % (ref 0–0.5)
LYMPHOCYTES # BLD AUTO: 3.29 10*3/MM3 (ref 0.7–3.1)
LYMPHOCYTES NFR BLD AUTO: 26.8 % (ref 19.6–45.3)
MAGNESIUM SERPL-MCNC: 2.9 MG/DL (ref 1.6–2.6)
MCH RBC QN AUTO: 29.9 PG (ref 26.6–33)
MCHC RBC AUTO-ENTMCNC: 33.3 G/DL (ref 31.5–35.7)
MCV RBC AUTO: 89.9 FL (ref 79–97)
MONOCYTES # BLD AUTO: 0.8 10*3/MM3 (ref 0.1–0.9)
MONOCYTES NFR BLD AUTO: 6.5 % (ref 5–12)
NEUTROPHILS # BLD AUTO: 7.8 10*3/MM3 (ref 1.7–7)
NEUTROPHILS NFR BLD AUTO: 63.6 % (ref 42.7–76)
NRBC BLD AUTO-RTO: 0 /100 WBC (ref 0–0.2)
PHOSPHATE SERPL-MCNC: 3 MG/DL (ref 2.5–4.5)
PLATELET # BLD AUTO: 306 10*3/MM3 (ref 140–450)
PMV BLD AUTO: 9.6 FL (ref 6–12)
POTASSIUM BLD-SCNC: 4 MMOL/L (ref 3.5–5.2)
RBC # BLD AUTO: 5.62 10*6/MM3 (ref 4.14–5.8)
SODIUM BLD-SCNC: 143 MMOL/L (ref 136–145)
T4 FREE SERPL-MCNC: 1.51 NG/DL (ref 0.93–1.7)
TSH SERPL DL<=0.05 MIU/L-ACNC: 1.15 UIU/ML (ref 0.27–4.2)
WBC NRBC COR # BLD: 12.28 10*3/MM3 (ref 3.4–10.8)

## 2019-11-07 PROCEDURE — 36415 COLL VENOUS BLD VENIPUNCTURE: CPT

## 2019-11-07 PROCEDURE — G0378 HOSPITAL OBSERVATION PER HR: HCPCS

## 2019-11-07 PROCEDURE — 84443 ASSAY THYROID STIM HORMONE: CPT | Performed by: EMERGENCY MEDICINE

## 2019-11-07 PROCEDURE — 85025 COMPLETE CBC W/AUTO DIFF WBC: CPT | Performed by: EMERGENCY MEDICINE

## 2019-11-07 PROCEDURE — 80048 BASIC METABOLIC PNL TOTAL CA: CPT | Performed by: EMERGENCY MEDICINE

## 2019-11-07 PROCEDURE — 93010 ELECTROCARDIOGRAM REPORT: CPT | Performed by: INTERNAL MEDICINE

## 2019-11-07 PROCEDURE — 71046 X-RAY EXAM CHEST 2 VIEWS: CPT

## 2019-11-07 PROCEDURE — 70551 MRI BRAIN STEM W/O DYE: CPT

## 2019-11-07 PROCEDURE — 93005 ELECTROCARDIOGRAM TRACING: CPT | Performed by: EMERGENCY MEDICINE

## 2019-11-07 PROCEDURE — 99204 OFFICE O/P NEW MOD 45 MIN: CPT | Performed by: NURSE PRACTITIONER

## 2019-11-07 PROCEDURE — 70496 CT ANGIOGRAPHY HEAD: CPT

## 2019-11-07 PROCEDURE — 99284 EMERGENCY DEPT VISIT MOD MDM: CPT

## 2019-11-07 PROCEDURE — 84100 ASSAY OF PHOSPHORUS: CPT | Performed by: EMERGENCY MEDICINE

## 2019-11-07 PROCEDURE — 83735 ASSAY OF MAGNESIUM: CPT | Performed by: EMERGENCY MEDICINE

## 2019-11-07 PROCEDURE — 70498 CT ANGIOGRAPHY NECK: CPT

## 2019-11-07 PROCEDURE — 84439 ASSAY OF FREE THYROXINE: CPT | Performed by: EMERGENCY MEDICINE

## 2019-11-07 RX ORDER — ONDANSETRON 2 MG/ML
4 INJECTION INTRAMUSCULAR; INTRAVENOUS EVERY 6 HOURS PRN
Status: DISCONTINUED | OUTPATIENT
Start: 2019-11-07 | End: 2019-11-08 | Stop reason: HOSPADM

## 2019-11-07 RX ORDER — SODIUM CHLORIDE 0.9 % (FLUSH) 0.9 %
10 SYRINGE (ML) INJECTION AS NEEDED
Status: DISCONTINUED | OUTPATIENT
Start: 2019-11-07 | End: 2019-11-08 | Stop reason: HOSPADM

## 2019-11-07 RX ORDER — ASPIRIN 325 MG
325 TABLET ORAL DAILY
Status: DISCONTINUED | OUTPATIENT
Start: 2019-11-08 | End: 2019-11-08 | Stop reason: HOSPADM

## 2019-11-07 RX ORDER — LISINOPRIL 20 MG/1
20 TABLET ORAL DAILY
Qty: 30 TABLET | Refills: 1 | Status: SHIPPED | OUTPATIENT
Start: 2019-11-07 | End: 2019-11-21 | Stop reason: SDUPTHER

## 2019-11-07 RX ORDER — ASPIRIN 81 MG/1
324 TABLET, CHEWABLE ORAL ONCE
Status: COMPLETED | OUTPATIENT
Start: 2019-11-07 | End: 2019-11-07

## 2019-11-07 RX ORDER — CHLORTHALIDONE 25 MG/1
25 TABLET ORAL ONCE
Status: COMPLETED | OUTPATIENT
Start: 2019-11-07 | End: 2019-11-07

## 2019-11-07 RX ORDER — BISACODYL 10 MG
10 SUPPOSITORY, RECTAL RECTAL DAILY PRN
Status: DISCONTINUED | OUTPATIENT
Start: 2019-11-07 | End: 2019-11-08 | Stop reason: HOSPADM

## 2019-11-07 RX ORDER — SODIUM CHLORIDE 0.9 % (FLUSH) 0.9 %
10 SYRINGE (ML) INJECTION EVERY 12 HOURS SCHEDULED
Status: DISCONTINUED | OUTPATIENT
Start: 2019-11-07 | End: 2019-11-08 | Stop reason: HOSPADM

## 2019-11-07 RX ORDER — ATORVASTATIN CALCIUM 80 MG/1
80 TABLET, FILM COATED ORAL NIGHTLY
Status: DISCONTINUED | OUTPATIENT
Start: 2019-11-07 | End: 2019-11-08 | Stop reason: HOSPADM

## 2019-11-07 RX ORDER — ASPIRIN 300 MG/1
300 SUPPOSITORY RECTAL DAILY
Status: DISCONTINUED | OUTPATIENT
Start: 2019-11-08 | End: 2019-11-08 | Stop reason: HOSPADM

## 2019-11-07 RX ORDER — SODIUM CHLORIDE 9 MG/ML
100 INJECTION, SOLUTION INTRAVENOUS CONTINUOUS
Status: DISCONTINUED | OUTPATIENT
Start: 2019-11-07 | End: 2019-11-08 | Stop reason: HOSPADM

## 2019-11-07 RX ADMIN — ATORVASTATIN CALCIUM 80 MG: 80 TABLET, FILM COATED ORAL at 22:56

## 2019-11-07 RX ADMIN — ASPIRIN 324 MG: 81 TABLET, CHEWABLE ORAL at 18:45

## 2019-11-07 RX ADMIN — IOPAMIDOL 95 ML: 755 INJECTION, SOLUTION INTRAVENOUS at 23:41

## 2019-11-07 RX ADMIN — CHLORTHALIDONE 25 MG: 25 TABLET ORAL at 18:52

## 2019-11-07 RX ADMIN — SODIUM CHLORIDE, PRESERVATIVE FREE 10 ML: 5 INJECTION INTRAVENOUS at 22:59

## 2019-11-07 RX ADMIN — SODIUM CHLORIDE 100 ML/HR: 9 INJECTION, SOLUTION INTRAVENOUS at 23:01

## 2019-11-07 RX ADMIN — SODIUM CHLORIDE, POTASSIUM CHLORIDE, SODIUM LACTATE AND CALCIUM CHLORIDE 1000 ML: 600; 310; 30; 20 INJECTION, SOLUTION INTRAVENOUS at 18:33

## 2019-11-07 NOTE — ED PROVIDER NOTES
EMERGENCY DEPARTMENT ENCOUNTER    Room Number:  28/28  Date of encounter:  11/7/2019  PCP: Lanie Resendez APRN  Historian: Patient      HPI:  Chief Complaint: Numbness  A complete HPI/ROS/PMH/PSH/SH/FH are unobtainable due to: None    Context: Roni Villaseñor is a 49 y.o. male who presents to the ED c/o numbness.  The location and is in his right face, arm and leg.  Symptoms are constant.  This started approximately 3 weeks ago.  It started several hours after he had helped somebody install some network equipment that weighed about 30 to 40 pounds.  He also notes that he has had some lack of control in his right arm and hand.  However, he feels that his handwriting looks about the same as normal.  Symptoms improved by nothing.  They worsened by nothing.  He denies having any headache, neck pain, change in vision, kedar weakness in his legs.      PAST MEDICAL HISTORY  Active Ambulatory Problems     Diagnosis Date Noted   • Dyspnea on exertion 06/27/2017   • Transient elevated blood pressure 06/27/2017   • Abnormal blood sugar 06/27/2017   • Cigarette nicotine dependence without complication 06/27/2017   • Electronic cigarette use 06/27/2017   • Abnormal stress test 11/07/2019     Resolved Ambulatory Problems     Diagnosis Date Noted   • No Resolved Ambulatory Problems     Past Medical History:   Diagnosis Date   • Abnormal blood sugar    • Abnormal CBC    • Abnormal EKG    • Abnormal nuclear stress test 06/28/2017   • Arm bruise    • TORRES (dyspnea on exertion)    • Fracture of elbow    • Heart murmur    • Malaise and fatigue    • Transient elevated blood pressure          PAST SURGICAL HISTORY  Past Surgical History:   Procedure Laterality Date   • NO PAST SURGERIES     • TOTAL ELBOW ARTHROPLASTY Left 6/29/2017    Procedure: RADIAL HEAD ARTHROPLASTY AND LATERAL COLLATERAL LIGAMENT REPAIR;  Surgeon: Dmitry Davila MD;  Location: [facility] OR;  Service:          FAMILY HISTORY  Family History   Problem Relation Age of  Onset   • Hypertension Father    • Hypertension Paternal Uncle          SOCIAL HISTORY  Social History     Socioeconomic History   • Marital status: Single     Spouse name: Not on file   • Number of children: Not on file   • Years of education: Not on file   • Highest education level: Not on file   Tobacco Use   • Smoking status: Current Every Day Smoker     Packs/day: 0.50     Years: 10.00     Pack years: 5.00     Types: Cigarettes, Electronic Cigarette   • Smokeless tobacco: Never Used   • Tobacco comment: electronic cigarette nicotine 2 amps last 2 weeks   Substance and Sexual Activity   • Alcohol use: Yes     Comment: rarely/  Daily caffeine use   • Drug use: Yes     Types: Marijuana     Comment: not regular basis   • Sexual activity: Defer         ALLERGIES  Patient has no known allergies.        REVIEW OF SYSTEMS  Review of Systems     All systems reviewed and negative except for those discussed in HPI.       PHYSICAL EXAM    I have reviewed the triage vital signs and nursing notes.    ED Triage Vitals   Temp Heart Rate Resp BP SpO2   11/07/19 1554 11/07/19 1554 11/07/19 1554 11/07/19 1609 11/07/19 1554   98.3 °F (36.8 °C) (!) 124 18 (!) 191/108 96 %      Temp src Heart Rate Source Patient Position BP Location FiO2 (%)   -- 11/07/19 1609 11/07/19 1609 11/07/19 1609 --    Monitor Sitting Left arm        Physical Exam  GENERAL: not distressed  HENT: nares patent  EYES: no scleral icterus  CV: regular rhythm, regular rate  RESPIRATORY: normal effort, clear to auscultation bilaterally  ABDOMEN: soft  MUSCULOSKELETAL: no deformity  NEURO:     Mental status  LOC: awake, alert and fully oriented to person, place, time, and situation.  Attention and memory intact. Fund of knowledge normal for age and education.  Language is fluent with normal naming, comprehension, and repetition.   There is no neglect.    Cranial nerves  PERRL  Visual fields full to confrontation.  EOMI with full versions, normal pursuits, and  saccades.   Facial strength is full and symmetric.   Facial sensation is intact in V1-V3.  Hearing is intact to finger rub bilaterally.   Tongue protrudes midline.   Uvula and palate elevate symmetrically.  Speech is clear without notable labial, lingual, or guttural dysarthria.   Shoulder shrug and sternocleidomastoid activation are full and symmetric.    Motor  Normal bulk and tone.   Strength is 5/5 in bilateral upper and lower extremities.     There is no pronator drift.    Sensory  Reports abnormal sensation in the right arm compared to the left arm.  He reports normal sensation currently in his face and legs.    Coordination  Normal rapid alternating movements.  Normal finger-nose-finger and heel-to-shin testing.    Station and gait  Normal station.  Normal gait.    Reflexes  No meningismus.       NIHSS 1    SKIN: warm, dry        LAB RESULTS  Recent Results (from the past 24 hour(s))   Basic Metabolic Panel    Collection Time: 11/07/19  5:00 PM   Result Value Ref Range    Glucose 225 (H) 65 - 99 mg/dL    BUN 14 6 - 20 mg/dL    Creatinine 0.99 0.76 - 1.27 mg/dL    Sodium 143 136 - 145 mmol/L    Potassium 4.0 3.5 - 5.2 mmol/L    Chloride 104 98 - 107 mmol/L    CO2 27.4 22.0 - 29.0 mmol/L    Calcium 9.2 8.6 - 10.5 mg/dL    eGFR  African Amer 98 >60 mL/min/1.73    eGFR Non African Amer 81 >60 mL/min/1.73    BUN/Creatinine Ratio 14.1 7.0 - 25.0    Anion Gap 11.6 5.0 - 15.0 mmol/L   TSH    Collection Time: 11/07/19  5:00 PM   Result Value Ref Range    TSH 1.150 0.270 - 4.200 uIU/mL   T4, Free    Collection Time: 11/07/19  5:00 PM   Result Value Ref Range    Free T4 1.51 0.93 - 1.70 ng/dL   Magnesium    Collection Time: 11/07/19  5:00 PM   Result Value Ref Range    Magnesium 2.9 (H) 1.6 - 2.6 mg/dL   Phosphorus    Collection Time: 11/07/19  5:00 PM   Result Value Ref Range    Phosphorus 3.0 2.5 - 4.5 mg/dL   CBC Auto Differential    Collection Time: 11/07/19  5:00 PM   Result Value Ref Range    WBC 12.28 (H) 3.40 -  10.80 10*3/mm3    RBC 5.62 4.14 - 5.80 10*6/mm3    Hemoglobin 16.8 13.0 - 17.7 g/dL    Hematocrit 50.5 37.5 - 51.0 %    MCV 89.9 79.0 - 97.0 fL    MCH 29.9 26.6 - 33.0 pg    MCHC 33.3 31.5 - 35.7 g/dL    RDW 12.9 12.3 - 15.4 %    RDW-SD 42.1 37.0 - 54.0 fl    MPV 9.6 6.0 - 12.0 fL    Platelets 306 140 - 450 10*3/mm3    Neutrophil % 63.6 42.7 - 76.0 %    Lymphocyte % 26.8 19.6 - 45.3 %    Monocyte % 6.5 5.0 - 12.0 %    Eosinophil % 2.2 0.3 - 6.2 %    Basophil % 0.7 0.0 - 1.5 %    Immature Grans % 0.2 0.0 - 0.5 %    Neutrophils, Absolute 7.80 (H) 1.70 - 7.00 10*3/mm3    Lymphocytes, Absolute 3.29 (H) 0.70 - 3.10 10*3/mm3    Monocytes, Absolute 0.80 0.10 - 0.90 10*3/mm3    Eosinophils, Absolute 0.27 0.00 - 0.40 10*3/mm3    Basophils, Absolute 0.09 0.00 - 0.20 10*3/mm3    Immature Grans, Absolute 0.03 0.00 - 0.05 10*3/mm3    nRBC 0.0 0.0 - 0.2 /100 WBC       Ordered the above labs and independently reviewed the results.        RADIOLOGY  Xr Chest 2 View    Result Date: 11/7/2019  XR CHEST 2 VW-  HISTORY: Male who is 49 years-old,  numbness of the right hand  TECHNIQUE: Frontal and lateral views of the chest  COMPARISON: None available  FINDINGS: Heart, mediastinum and pulmonary vasculature are unremarkable. No focal pulmonary consolidation, pleural effusion, or pneumothorax. No acute osseous process.      No evidence for acute pulmonary process. Follow-up as clinical indications persist.  This report was finalized on 11/7/2019 4:42 PM by Dr. Mal Leos M.D.      Mri Brain Without Contrast    Result Date: 11/7/2019  MRI EXAMINATION OF THE BRAIN WITHOUT CONTRAST  HISTORY: Right arm, face and neck pain.  COMPARISON: None.  TECHNIQUE: A noncontrasted MRI examination of the brain was performed utilizing sagittal T1, axial diffusion, T1, T2, T2 FLAIR and gradient echo T2 imaging.  FINDINGS: There is no evidence of restricted diffusion to suggest acute infarction. There is a mild degree of increased signal intensity  involving the periventricular white matter of the cerebral hemispheres bilaterally on the axial T2 FLAIR sequence. A small lacunar infarct involving the thalamus on the left is appreciated measuring approximately 8 to 9 mm in size. There is expected flow-void in the basilar artery and in the distal aspect of the internal carotid arteries bilaterally on the axial T2 sequence. There is mild mucosal thickening involving the ethmoid air cells bilaterally.      No evidence of restricted diffusion. An area of T2 FLAIR hyperintensity is identified involving the thalamus on the left laterally measuring 8 to 9 mm in size. There is no evidence of restricted diffusion but restricted diffusion typically persists for only 10-14 days following an acute infarct. Reportedly the patient has been symptomatic greater than 10-14 days. This may represent a subacute infarct. There is mild small vessel ischemic disease, slightly more prominent for the patient's age. Clinical correlation is recommended. There is no evidence of mass or of mass effect on this noncontrasted MRI examination of the brain.  The above information was called to Dr. Pereira at the time of the dictation.        I ordered the above noted radiological studies. Reviewed by me and discussed with radiologist.  See dictation for official radiology interpretation.      PROCEDURES    Procedures      MEDICATIONS GIVEN IN ER    Medications   sodium chloride 0.9 % flush 10 mL (not administered)   lactated ringers bolus 1,000 mL (1,000 mL Intravenous New Bag 11/7/19 1833)   aspirin chewable tablet 324 mg (324 mg Oral Given 11/7/19 1845)   chlorthalidone (HYGROTON) tablet 25 mg (25 mg Oral Given 11/7/19 1852)         PROGRESS, DATA ANALYSIS, CONSULTS, AND MEDICAL DECISION MAKING    All labs have been independently reviewed by me.  All radiology studies have been reviewed by me and discussed with radiologist dictating the report.   EKG's independently viewed and interpreted by me.   Discussion below represents my analysis of pertinent findings related to patient's condition, differential diagnosis, treatment plan and final disposition.    Differential diagnosis includes stroke, cervical radiculopathy, electrolyte abnormality, thyroid abnormality.    MRI confirms left lacunar stroke after conversation with Dr. Velazquez.    Patient given chlorthalidone and aspirin.    ED Course as of Nov 07 1853   u Nov 07, 2019   1749 Magnesium: (!) 2.9 [TD]   1749 WBC: (!) 12.28 [TD]   1833 EKG interpreted by myself.  Time 1641.  Sinus rhythm.  Heart rate 107.  Normal axis.  Normal intervals.  There is T wave flattening in lead aVF.  [TD]   1834 On medical chart review, old EKG obtained from 6/23/2017.  This shows sinus tachycardia.  Heart rate 110.  There is again T wave abnormality in aVF.  There is T wave flattening in V5 and V6.  [TD]   1850 Patient is not a TPA candidate due to onset of symptoms for greater than 4.5 hours ago.  [TD]   1851 I spoke with Dr. Daniels who will admit the patient.  [TD]      ED Course User Index  [TD] Abraham Pereira II, MD       AS OF 6:53 PM VITALS:    BP - (!) 193/98  HR - 99  TEMP - 98.3 °F (36.8 °C)  02 SATS - 98%        DIAGNOSIS  Final diagnoses:   Cerebrovascular accident (CVA), unspecified mechanism (CMS/HCC)   Primary hypertension   Right sided numbness         DISPOSITION  Admit           Abraham Pereira II, MD  11/07/19 1853

## 2019-11-07 NOTE — PATIENT INSTRUCTIONS
Patient to ER at this time for right sided numbness, deferred ambulance report called to [facility] advised to go straight to ER at this time for eval.   Encouraged smoking cessation, educated about uncontrolled BP, risk for stroke and MI,   Advised to f/u in about 2 weeks for BP recheck, fasting labs,   Begin lisinopril 20mg daily in am, monitor BP at home call with elevated readings,   Increase fluid intake, get plenty of rest.   Patient agrees with plan of care and understands instructions. Call if worsening symptoms or any problems or concerns.

## 2019-11-07 NOTE — ED NOTES
Report received from internal medicine.  Patient was seen for uncontrolled hypertension and right sided numbness that started three weeks ago but is resolving.  Patient was prescribed linisopril today but referred for right sided numbness.     Yvonne Bell RN  11/07/19 2142

## 2019-11-07 NOTE — PROGRESS NOTES
"Subjective   Roni Villaseñor is a 49 y.o. male.     History of Present Illness   Here today to establish care,  No recent pcp, he works in IT, he is single, no children, he states he does stay active, states diet is ok, working on diet and exercise, walking. Does smoke about 4-5 cigarettes per day for about 20 years.   C/o tingling right arm, states he did heavy lifting about 3 weeks ago, still having right arm pain, did not try any medications at home, states arms were over head holding up equipment, thought felt strain in back, states he had hand numbness, felt tightness in right arm, muscle. States improved some.  Denies HA, memory changes. He has had some numbness on right face and upper leg. He states gait and ambulation normal, states right side of leg, arm, face feel \"tight.\" he admits to having trouble putting in contact lens on right side and trouble with writing he is right handed.   With HTN, does not currently taking medication, prev saw cardiology Dr. Elliott in 2017, hx of abnormal stress test. Recommended diet exercise smoking cessation BP control and baby asa daily. He has not taken BP meds before. Denies CP, SOA, denies HA, LE edema, dizziness. does not take asa regularly. He is not fasting today. Does have back and neck pain. Denies changes in vision, slurred speech, confusion. Denies family history of heart disease.   Never had colonoscopy or prostate exam.       The following portions of the patient's history were reviewed and updated as appropriate: allergies, current medications, past family history, past medical history, past social history, past surgical history and problem list.    Review of Systems   Constitutional: Negative for chills, diaphoresis and fever.   Eyes: Negative for photophobia and visual disturbance.   Respiratory: Negative for cough and shortness of breath.    Cardiovascular: Negative for chest pain, palpitations and leg swelling.   Musculoskeletal: Positive for arthralgias, back " pain and neck pain. Negative for myalgias.   Neurological: Positive for weakness and numbness. Negative for dizziness, tremors, seizures, syncope, facial asymmetry, speech difficulty, light-headedness, headache, memory problem and confusion.   All other systems reviewed and are negative.      Objective   Physical Exam   Constitutional: He is oriented to person, place, and time. He appears well-developed and well-nourished.   HENT:   Head: Normocephalic.   Eyes: Pupils are equal, round, and reactive to light.   Neck: Normal range of motion.   Cardiovascular: Normal rate, regular rhythm and intact distal pulses.   Murmur heard.  Pulses:       Radial pulses are 2+ on the right side, and 2+ on the left side.        Dorsalis pedis pulses are 2+ on the right side, and 2+ on the left side.        Posterior tibial pulses are 2+ on the right side, and 2+ on the left side.   Pulmonary/Chest: Effort normal and breath sounds normal.   Musculoskeletal: Normal range of motion.        Right shoulder: Normal.        Left shoulder: Normal.        Cervical back: He exhibits tenderness. He exhibits normal range of motion.        Lumbar back: He exhibits normal range of motion and no tenderness.   Lymphadenopathy:     He has no cervical adenopathy.   Neurological: He is alert and oriented to person, place, and time. He has normal strength. A sensory deficit is present. No cranial nerve deficit. He displays a negative Romberg sign. Coordination and gait normal.   Skin: Skin is warm and dry.   Psychiatric: He has a normal mood and affect. His behavior is normal.   Nursing note and vitals reviewed.        Assessment/Plan   Roni was seen today for establish care and tingling.    Diagnoses and all orders for this visit:    Encounter to establish care  -     Comprehensive Metabolic Panel; Future  -     CBC & Differential; Future  -     Lipid Panel; Future  -     TSH; Future  -     Hemoglobin A1c; Future    Abnormal stress test  -      Comprehensive Metabolic Panel; Future  -     CBC & Differential; Future  -     Lipid Panel; Future  -     TSH; Future  -     Hemoglobin A1c; Future    Elevated BP without diagnosis of hypertension  -     Comprehensive Metabolic Panel; Future  -     CBC & Differential; Future  -     Lipid Panel; Future  -     TSH; Future  -     Hemoglobin A1c; Future    Tobacco abuse  -     Comprehensive Metabolic Panel; Future  -     CBC & Differential; Future  -     Lipid Panel; Future  -     TSH; Future  -     Hemoglobin A1c; Future    Numbness and tingling of right arm  -     Comprehensive Metabolic Panel; Future  -     CBC & Differential; Future  -     Lipid Panel; Future  -     TSH; Future  -     Hemoglobin A1c; Future    Neck pain  -     Comprehensive Metabolic Panel; Future  -     CBC & Differential; Future  -     Lipid Panel; Future  -     TSH; Future  -     Hemoglobin A1c; Future    Other orders  -     lisinopril (PRINIVIL,ZESTRIL) 20 MG tablet; Take 1 tablet by mouth Daily.    Patient to ER at this time for right sided numbness, deferred ambulance report called to [facility] advised to go straight to ER at this time for eval.   Encouraged smoking cessation, educated about uncontrolled BP, risk for stroke and MI,   Advised to f/u in about 2 weeks for BP recheck, fasting labs,   Begin lisinopril 20mg daily in am, monitor BP at home call with elevated readings,   Increase fluid intake, get plenty of rest.   Patient agrees with plan of care and understands instructions. Call if worsening symptoms or any problems or concerns.

## 2019-11-08 ENCOUNTER — APPOINTMENT (OUTPATIENT)
Dept: CARDIOLOGY | Facility: HOSPITAL | Age: 49
End: 2019-11-08

## 2019-11-08 VITALS
RESPIRATION RATE: 18 BRPM | HEART RATE: 97 BPM | WEIGHT: 195 LBS | HEIGHT: 68 IN | TEMPERATURE: 97 F | DIASTOLIC BLOOD PRESSURE: 77 MMHG | SYSTOLIC BLOOD PRESSURE: 154 MMHG | OXYGEN SATURATION: 97 % | BODY MASS INDEX: 29.55 KG/M2

## 2019-11-08 PROBLEM — E11.65 TYPE 2 DIABETES MELLITUS WITH HYPERGLYCEMIA (HCC): Status: ACTIVE | Noted: 2019-11-08

## 2019-11-08 PROBLEM — E78.5 HLD (HYPERLIPIDEMIA): Status: ACTIVE | Noted: 2019-11-08

## 2019-11-08 LAB
AORTIC DIMENSIONLESS INDEX: 0.5 (DI)
BH CV ECHO MEAS - ACS: 1.7 CM
BH CV ECHO MEAS - AO MAX PG: 12 MMHG
BH CV ECHO MEAS - AO MEAN PG (FULL): 5 MMHG
BH CV ECHO MEAS - AO MEAN PG: 7 MMHG
BH CV ECHO MEAS - AO ROOT AREA (BSA CORRECTED): 1.1
BH CV ECHO MEAS - AO ROOT AREA: 4.2 CM^2
BH CV ECHO MEAS - AO ROOT DIAM: 2.3 CM
BH CV ECHO MEAS - AO V2 MAX: 176 CM/SEC
BH CV ECHO MEAS - AO V2 MEAN: 125 CM/SEC
BH CV ECHO MEAS - AO V2 VTI: 33 CM
BH CV ECHO MEAS - ASC AORTA: 2.9 CM
BH CV ECHO MEAS - AVA(I,A): 1.3 CM^2
BH CV ECHO MEAS - AVA(I,D): 1.3 CM^2
BH CV ECHO MEAS - BSA(HAYCOCK): 2.1 M^2
BH CV ECHO MEAS - BSA: 2 M^2
BH CV ECHO MEAS - BZI_BMI: 29.6 KILOGRAMS/M^2
BH CV ECHO MEAS - BZI_METRIC_HEIGHT: 172.7 CM
BH CV ECHO MEAS - BZI_METRIC_WEIGHT: 88.5 KG
BH CV ECHO MEAS - EDV(CUBED): 74.1 ML
BH CV ECHO MEAS - EDV(MOD-SP2): 83 ML
BH CV ECHO MEAS - EDV(MOD-SP4): 107 ML
BH CV ECHO MEAS - EDV(TEICH): 78.6 ML
BH CV ECHO MEAS - EF(CUBED): 51.5 %
BH CV ECHO MEAS - EF(MOD-BP): 54 %
BH CV ECHO MEAS - EF(MOD-SP2): 54.2 %
BH CV ECHO MEAS - EF(MOD-SP4): 56.1 %
BH CV ECHO MEAS - EF(TEICH): 43.8 %
BH CV ECHO MEAS - ESV(CUBED): 35.9 ML
BH CV ECHO MEAS - ESV(MOD-SP2): 38 ML
BH CV ECHO MEAS - ESV(MOD-SP4): 47 ML
BH CV ECHO MEAS - ESV(TEICH): 44.1 ML
BH CV ECHO MEAS - FS: 21.4 %
BH CV ECHO MEAS - IVS/LVPW: 1.1
BH CV ECHO MEAS - IVSD: 1.2 CM
BH CV ECHO MEAS - LAT PEAK E' VEL: 14 CM/SEC
BH CV ECHO MEAS - LV DIASTOLIC VOL/BSA (35-75): 52.9 ML/M^2
BH CV ECHO MEAS - LV MASS(C)D: 167.4 GRAMS
BH CV ECHO MEAS - LV MASS(C)DI: 82.8 GRAMS/M^2
BH CV ECHO MEAS - LV MAX PG: 3 MMHG
BH CV ECHO MEAS - LV MEAN PG: 2 MMHG
BH CV ECHO MEAS - LV SYSTOLIC VOL/BSA (12-30): 23.2 ML/M^2
BH CV ECHO MEAS - LV V1 MAX: 84 CM/SEC
BH CV ECHO MEAS - LV V1 MEAN: 62.1 CM/SEC
BH CV ECHO MEAS - LV V1 VTI: 15.7 CM
BH CV ECHO MEAS - LVIDD: 4.2 CM
BH CV ECHO MEAS - LVIDS: 3.3 CM
BH CV ECHO MEAS - LVLD AP2: 8.5 CM
BH CV ECHO MEAS - LVLD AP4: 8.2 CM
BH CV ECHO MEAS - LVLS AP2: 7.9 CM
BH CV ECHO MEAS - LVLS AP4: 7.2 CM
BH CV ECHO MEAS - LVOT AREA (M): 2.8 CM^2
BH CV ECHO MEAS - LVOT AREA: 2.8 CM^2
BH CV ECHO MEAS - LVOT DIAM: 1.9 CM
BH CV ECHO MEAS - LVPWD: 1.1 CM
BH CV ECHO MEAS - MED PEAK E' VEL: 6 CM/SEC
BH CV ECHO MEAS - MV A DUR: 0.17 SEC
BH CV ECHO MEAS - MV A MAX VEL: 81.4 CM/SEC
BH CV ECHO MEAS - MV DEC SLOPE: 463 CM/SEC^2
BH CV ECHO MEAS - MV DEC TIME: 193 SEC
BH CV ECHO MEAS - MV E MAX VEL: 90.8 CM/SEC
BH CV ECHO MEAS - MV E/A: 1.1
BH CV ECHO MEAS - MV MEAN PG: 2 MMHG
BH CV ECHO MEAS - MV P1/2T MAX VEL: 101 CM/SEC
BH CV ECHO MEAS - MV P1/2T: 63.9 MSEC
BH CV ECHO MEAS - MV V2 MEAN: 65.1 CM/SEC
BH CV ECHO MEAS - MV V2 VTI: 24.8 CM
BH CV ECHO MEAS - MVA P1/2T LCG: 2.2 CM^2
BH CV ECHO MEAS - MVA(P1/2T): 3.4 CM^2
BH CV ECHO MEAS - MVA(VTI): 1.8 CM^2
BH CV ECHO MEAS - PA ACC SLOPE: 1554 CM/SEC^2
BH CV ECHO MEAS - PA ACC TIME: 0.06 SEC
BH CV ECHO MEAS - PA MAX PG (FULL): 1.5 MMHG
BH CV ECHO MEAS - PA MAX PG: 3.9 MMHG
BH CV ECHO MEAS - PA PR(ACCEL): 50.7 MMHG
BH CV ECHO MEAS - PA V2 MAX: 99.3 CM/SEC
BH CV ECHO MEAS - PULM A REVS DUR: 0.12 SEC
BH CV ECHO MEAS - PULM A REVS VEL: 23.4 CM/SEC
BH CV ECHO MEAS - PULM DIAS VEL: 35.5 CM/SEC
BH CV ECHO MEAS - PULM S/D: 1.2
BH CV ECHO MEAS - PULM SYS VEL: 42 CM/SEC
BH CV ECHO MEAS - PVA(V,A): 2 CM^2
BH CV ECHO MEAS - PVA(V,D): 2 CM^2
BH CV ECHO MEAS - QP/QS: 0.79
BH CV ECHO MEAS - RAP SYSTOLE: 3 MMHG
BH CV ECHO MEAS - RV MAX PG: 2.4 MMHG
BH CV ECHO MEAS - RV MEAN PG: 1 MMHG
BH CV ECHO MEAS - RV V1 MAX: 78.2 CM/SEC
BH CV ECHO MEAS - RV V1 MEAN: 54.7 CM/SEC
BH CV ECHO MEAS - RV V1 VTI: 13.9 CM
BH CV ECHO MEAS - RVOT AREA: 2.5 CM^2
BH CV ECHO MEAS - RVOT DIAM: 1.8 CM
BH CV ECHO MEAS - RVSP: 19 MMHG
BH CV ECHO MEAS - SI(AO): 67.8 ML/M^2
BH CV ECHO MEAS - SI(CUBED): 18.9 ML/M^2
BH CV ECHO MEAS - SI(LVOT): 22 ML/M^2
BH CV ECHO MEAS - SI(MOD-SP2): 22.3 ML/M^2
BH CV ECHO MEAS - SI(MOD-SP4): 29.7 ML/M^2
BH CV ECHO MEAS - SI(TEICH): 17 ML/M^2
BH CV ECHO MEAS - SV(AO): 137.1 ML
BH CV ECHO MEAS - SV(CUBED): 38.2 ML
BH CV ECHO MEAS - SV(LVOT): 44.5 ML
BH CV ECHO MEAS - SV(MOD-SP2): 45 ML
BH CV ECHO MEAS - SV(MOD-SP4): 60 ML
BH CV ECHO MEAS - SV(RVOT): 35.4 ML
BH CV ECHO MEAS - SV(TEICH): 34.4 ML
BH CV ECHO MEAS - TAPSE (>1.6): 2 CM2
BH CV ECHO MEAS - TR MAX VEL: 197 CM/SEC
BH CV ECHO MEASUREMENTS AVERAGE E/E' RATIO: 9.08
BH CV VAS BP LEFT ARM: NORMAL MMHG
BH CV XLRA - RV BASE: 2.7 CM
BH CV XLRA - TDI S': 12 CM/SEC
CHOLEST SERPL-MCNC: 166 MG/DL (ref 0–200)
GLUCOSE BLDC GLUCOMTR-MCNC: 167 MG/DL (ref 70–130)
GLUCOSE BLDC GLUCOMTR-MCNC: 168 MG/DL (ref 70–130)
HBA1C MFR BLD: 10.4 % (ref 4.8–5.6)
HDLC SERPL-MCNC: 31 MG/DL (ref 40–60)
LDLC SERPL CALC-MCNC: 100 MG/DL (ref 0–100)
LDLC/HDLC SERPL: 3.21 {RATIO}
LEFT ATRIUM VOLUME INDEX: 12 ML/M2
MAXIMAL PREDICTED HEART RATE: 172 BPM
STRESS TARGET HR: 146 BPM
TRIGL SERPL-MCNC: 177 MG/DL (ref 0–150)
VLDLC SERPL-MCNC: 35.4 MG/DL (ref 5–40)

## 2019-11-08 PROCEDURE — 80061 LIPID PANEL: CPT | Performed by: HOSPITALIST

## 2019-11-08 PROCEDURE — 83036 HEMOGLOBIN GLYCOSYLATED A1C: CPT | Performed by: HOSPITALIST

## 2019-11-08 PROCEDURE — 36415 COLL VENOUS BLD VENIPUNCTURE: CPT | Performed by: HOSPITALIST

## 2019-11-08 PROCEDURE — 99243 OFF/OP CNSLTJ NEW/EST LOW 30: CPT | Performed by: PSYCHIATRY & NEUROLOGY

## 2019-11-08 PROCEDURE — G0378 HOSPITAL OBSERVATION PER HR: HCPCS

## 2019-11-08 PROCEDURE — 82962 GLUCOSE BLOOD TEST: CPT

## 2019-11-08 PROCEDURE — 0 IOPAMIDOL PER 1 ML: Performed by: HOSPITALIST

## 2019-11-08 PROCEDURE — 25010000002 PERFLUTREN (DEFINITY) 8.476 MG IN SODIUM CHLORIDE 0.9 % 10 ML INJECTION: Performed by: HOSPITALIST

## 2019-11-08 PROCEDURE — 93306 TTE W/DOPPLER COMPLETE: CPT | Performed by: INTERNAL MEDICINE

## 2019-11-08 PROCEDURE — 93306 TTE W/DOPPLER COMPLETE: CPT

## 2019-11-08 RX ORDER — GLIPIZIDE 5 MG/1
5 TABLET ORAL
Qty: 30 TABLET | Refills: 0 | Status: SHIPPED | OUTPATIENT
Start: 2019-11-09 | End: 2019-11-21 | Stop reason: SDUPTHER

## 2019-11-08 RX ORDER — LISINOPRIL 20 MG/1
20 TABLET ORAL DAILY
Status: DISCONTINUED | OUTPATIENT
Start: 2019-11-08 | End: 2019-11-08 | Stop reason: HOSPADM

## 2019-11-08 RX ORDER — AMLODIPINE BESYLATE 5 MG/1
5 TABLET ORAL
Status: DISCONTINUED | OUTPATIENT
Start: 2019-11-08 | End: 2019-11-08

## 2019-11-08 RX ORDER — NICOTINE POLACRILEX 4 MG
15 LOZENGE BUCCAL
Status: DISCONTINUED | OUTPATIENT
Start: 2019-11-08 | End: 2019-11-08 | Stop reason: HOSPADM

## 2019-11-08 RX ORDER — AMLODIPINE BESYLATE 5 MG/1
5 TABLET ORAL
Qty: 30 TABLET | Refills: 0 | Status: SHIPPED | OUTPATIENT
Start: 2019-11-09 | End: 2019-11-21 | Stop reason: SDUPTHER

## 2019-11-08 RX ORDER — GLIPIZIDE 5 MG/1
5 TABLET ORAL
Status: DISCONTINUED | OUTPATIENT
Start: 2019-11-08 | End: 2019-11-08 | Stop reason: HOSPADM

## 2019-11-08 RX ORDER — AMLODIPINE BESYLATE 5 MG/1
5 TABLET ORAL
Status: DISCONTINUED | OUTPATIENT
Start: 2019-11-09 | End: 2019-11-08 | Stop reason: HOSPADM

## 2019-11-08 RX ORDER — DEXTROSE MONOHYDRATE 25 G/50ML
25 INJECTION, SOLUTION INTRAVENOUS
Status: DISCONTINUED | OUTPATIENT
Start: 2019-11-08 | End: 2019-11-08 | Stop reason: HOSPADM

## 2019-11-08 RX ORDER — ATORVASTATIN CALCIUM 80 MG/1
80 TABLET, FILM COATED ORAL NIGHTLY
Qty: 30 TABLET | Refills: 0 | Status: SHIPPED | OUTPATIENT
Start: 2019-11-08 | End: 2019-11-21 | Stop reason: SDUPTHER

## 2019-11-08 RX ADMIN — ASPIRIN 325 MG: 325 TABLET ORAL at 08:05

## 2019-11-08 RX ADMIN — SODIUM CHLORIDE, PRESERVATIVE FREE 10 ML: 5 INJECTION INTRAVENOUS at 08:07

## 2019-11-08 RX ADMIN — LISINOPRIL 20 MG: 20 TABLET ORAL at 14:34

## 2019-11-08 RX ADMIN — GLIPIZIDE 5 MG: 5 TABLET ORAL at 11:46

## 2019-11-08 RX ADMIN — PERFLUTREN 1.5 ML: 6.52 INJECTION, SUSPENSION INTRAVENOUS at 10:40

## 2019-11-08 NOTE — PLAN OF CARE
Problem: Patient Care Overview  Goal: Plan of Care Review  Outcome: Ongoing (interventions implemented as appropriate)   11/08/19 1520   OTHER   Outcome Summary Discussed pt with RN. Pt passed nursing swallow screen and is tolerating a regular diet. MRI negative for acute CVA. ST is not indicated at this time. Will s/o. Please reconsult if needed.

## 2019-11-08 NOTE — PROGRESS NOTES
Discharge Planning Assessment  [facility]     Patient Name: Roni Villaseñor  MRN: [id number]  Today's Date: 11/8/2019    Admit Date: 11/7/2019    Discharge Needs Assessment     Row Name 11/08/19 1401       Living Environment    Lives With  alone    Current Living Arrangements  home/apartment/condo    Provides Primary Care For  no one    Family Caregiver if Needed  other relative(s)    Quality of Family Relationships  helpful;involved;supportive    Able to Return to Prior Arrangements  yes       Resource/Environmental Concerns    Resource/Environmental Concerns  none       Transition Planning    Patient/Family Anticipates Transition to  home    Patient/Family Anticipated Services at Transition  none    Transportation Anticipated  car, drives self       Discharge Needs Assessment    Concerns to be Addressed  no discharge needs identified;denies needs/concerns at this time    Equipment Currently Used at Home  none    Offered/Gave Vendor List  yes Denies need        Discharge Plan     Row Name 11/08/19 1404       Plan    Plan  Home    Patient/Family in Agreement with Plan  yes    Plan Comments  Introduced self and role od CCP. Facesheet verified. Pateint lives alone in a house. There are not steps to enter the home. Home does have a basement but patient states he doesn't have to go down there. Prior to admission, patient was independent with ADL's, drove and works fulltime. Uses No DME's. Has never used a HH or rehab in past. DC plan is home. Denies any needs/equipment.                Demographic Summary     Row Name 11/08/19 5557       General Information    Admission Type  inpatient    Arrived From  home    Required Notices Provided  Important Message from Medicare    Reason for Consult  discharge planning    Preferred Language  English     Used During This Interaction  no        Functional Status     Row Name 11/08/19 1356       Functional Status    Usual Activity Tolerance  good    Current Activity Tolerance   good       Functional Status, IADL    Medications  independent    Meal Preparation  independent    Housekeeping  independent    Laundry  independent    Shopping  independent       Mental Status    General Appearance WDL  WDL       Employment/    Employment Status  employed full time        Psychosocial     Row Name 11/08/19 1358       Values/Beliefs    Spiritual, Cultural Beliefs, Cheondoism Practices, Values that Affect Care  no       Behavior WDL    Behavior WDL  WDL       Emotion Mood WDL    Emotion/Mood/Affect WDL  WDL       Speech WDL    Speech WDL  WDL       Perceptual State WDL    Perceptual State WDL  WDL       Coping/Stress    Sources of Support  other family members    Reaction to Health Status  accepting;adjusting    Understanding of Condition and Treatment  adequate understanding of medical condition;adequate understanding of treatment       Developmental Stage (Eriksson's)    Developmental Stage  Stage 8 (65 years-death/Late Adulthood) Integrity vs. Despair        Abuse/Neglect     Row Name 11/08/19 7565       Personal Safety    Feels Unsafe at Home or Work/School  no    Feels Threatened by Someone  no    Does Anyone Try to Keep You From Having Contact with Others or Doing Things Outside Your Home?  no    Physical Signs of Abuse Present  no                Polina Davis, RN

## 2019-11-08 NOTE — SIGNIFICANT NOTE
11/08/19 1002   Rehab Time/Intention   Evaluation Not Performed (pt off floor and RN states OT eval not needed.  RN aware OT will sign off. )   Rehab Treatment   Discipline occupational therapist

## 2019-11-08 NOTE — CONSULTS
"Diabetes Education  Assessment/Teaching    Patient Name:  Roni Villaseñor  YOB: 1970  MRN: [id number]  Admit Date:  11/7/2019      Assessment Date:  11/8/2019    Most Recent Value   General Information    Referral From:  MD vargas   Height  172.7 cm (68\")   Height Method  Stated   Weight  88.5 kg (195 lb)   Weight Method  Standing scale   Pregnancy Assessment   Diabetes History   What type of diabetes do you have?  Type 2   Length of Diabetes Diagnosis  Newly diagnosed <6 months   Have you had diabetes education/teaching in the past?  no   Education Preferences   Barriers to Learning  other (comment) [None noted]   Nutrition Information   Assessment Topics   Healthy Eating - Assessment  Needs education   Being Active - Assessment  Needs education   Taking Medication - Assessment  Needs education   Problem Solving - Assessment  Needs education   Reducing Risk - Assessment  Needs education   Monitoring - Assessment  Needs education   DM Goals   Healthy Eating - Goal  0-30 days from discharge   Being Active - Goal  0-30 days from discharge   Taking Medication - Goal  0-7 days from discharge   Problem Solving - Goal  0-30 days from discharge   Reducing Risk - Goal  30-90 days from discharge   Monitoring - Goal  0-7 days from discharge   Contact Plan  Follow-up medical care            Most Recent Value   DM Education Needs   Meter  Meter provided   Meter Type  Accuchek [Guide]   Frequency of Testing  AC meals   Blood Glucose Target Range   mg/dL premeal   Medication  Oral, Actions, Side effects [Metformin and glipizide]   Problem Solving  Hypoglycemia, Hyperglycemia, Sick days, Signs, Symptoms, Treatment   Reducing Risks  A1C testing [A1c 10.4%]   Healthy Eating  Reviewed meal plan, Basic meal plan provided   Physical Activity  Walking [Resistance bands, weights.]   Physical Activity Frequency  Discussed exercise importance [Encouraged slow weight loss. ]   Healthy Coping  Appropriate   Discharge Plan  " Home, Follow-up with PCP   Motivation  Engaged, Strong   Teaching Method  Handouts, Demonstration, Discussion, Explanation, Teach back   Patient Response  Demonstrates adequately, Verbalized understanding                    Electronically signed by:  Marcelle Sheffield RN  11/08/19 3:09 PM

## 2019-11-08 NOTE — PLAN OF CARE
Problem: Patient Care Overview  Goal: Plan of Care Review  Outcome: Ongoing (interventions implemented as appropriate)   11/08/19 6504   Coping/Psychosocial   Plan of Care Reviewed With patient   Plan of Care Review   Progress improving   OTHER   Outcome Summary Diabetes education completed.     Goal: Discharge Needs Assessment  Outcome: Ongoing (interventions implemented as appropriate)

## 2019-11-08 NOTE — SIGNIFICANT NOTE
11/08/19 0938   Rehab Time/Intention   Evaluation Not Performed other (see comments)  (Pt up independently per RN. No deficits noted and PT not warranted. Will sign off.)   Rehab Treatment   Discipline physical therapist

## 2019-11-08 NOTE — CONSULTS
Neurology Note    Patient:  Roni Villaseñor    YOB: 1970    REFERRING PHYSICIAN:  Pete Daniels MD    CHIEF COMPLAINT:    Right sided numbness    HISTORY OF PRESENT ILLNESS:   The patient is a 49 y.o. male with HTN, DM, smoking referred to ED by new primary provider when he reported new onset of right sided numbness and weakness, right hand clumsiness that developed about 3 weeks ago and gradually improved. Denies other stroke symptoms. He used to take aspirin at some point but had stopped. Currently he still feels that the right side of the face and body still feels a bit tingly and tight but weakness and clumsiness have resolved. In ED /108, NSR, , MRI with small subacute left thalamic lacunar stroke, personally reviewed. He received aspirin 324 mg, NS bolus, chlorthalidone. His BP has improved, last 154/77.    Past Medical History:  Past Medical History:   Diagnosis Date   • Abnormal blood sugar    • Abnormal CBC    • Abnormal EKG    • Abnormal nuclear stress test 06/28/2017    Previous inferior infarct; no ischemia noted   • Arm bruise     scab on left knee   • Bronchitis     college years   • TORRES (dyspnea on exertion)    • Fracture of elbow     left   • Heart murmur    • HLD (hyperlipidemia) 11/8/2019   • Malaise and fatigue    • Transient elevated blood pressure        Past Surgical History:  Past Surgical History:   Procedure Laterality Date   • NO PAST SURGERIES     • TOTAL ELBOW ARTHROPLASTY Left 6/29/2017    Procedure: RADIAL HEAD ARTHROPLASTY AND LATERAL COLLATERAL LIGAMENT REPAIR;  Surgeon: Dmitry Davila MD;  Location: [facility];  Service:        Social History:   Social History     Socioeconomic History   • Marital status: Single     Spouse name: Not on file   • Number of children: Not on file   • Years of education: Not on file   • Highest education level: Not on file   Tobacco Use   • Smoking status: Current Every Day Smoker     Packs/day: 0.50     Years: 10.00      Pack years: 5.00     Types: Cigarettes, Electronic Cigarette   • Smokeless tobacco: Never Used   • Tobacco comment: electronic cigarette nicotine 2 amps last 2 weeks   Substance and Sexual Activity   • Alcohol use: Yes     Comment: rarely/  Daily caffeine use   • Drug use: Yes     Types: Marijuana     Comment: not regular basis   • Sexual activity: Defer        Family History:   Family History   Problem Relation Age of Onset   • Hypertension Father    • Hypertension Paternal Uncle    • Rheum arthritis Mother    • Rheum arthritis Sister        Medications Prior to Admission:    Prior to Admission medications    Medication Sig Start Date End Date Taking? Authorizing Provider   lisinopril (PRINIVIL,ZESTRIL) 20 MG tablet Take 1 tablet by mouth Daily. 11/7/19  Yes Lanie Resendez APRN   amLODIPine (NORVASC) 5 MG tablet Take 1 tablet by mouth Daily. 11/9/19   See Carr MD   aspirin 81 MG tablet Take 1 tablet by mouth Daily. 11/8/19   See Carr MD   atorvastatin (LIPITOR) 80 MG tablet Take 1 tablet by mouth Every Night. 11/8/19   See Carr MD   glipizide (GLUCOTROL) 5 MG tablet Take 1 tablet by mouth Every Morning Before Breakfast. 11/9/19   See Carr MD   metFORMIN (GLUCOPHAGE) 500 MG tablet Take 1 tablet by mouth 2 (Two) Times a Day With Meals. Start taking on 11/11 11/11/19   See Carr MD   naproxen sodium (ALEVE) 220 MG tablet Take 220 mg by mouth 2 (Two) Times a Day As Needed for Mild Pain (1-3).    Provider, MD Dylan       Allergies:  Patient has no known allergies.      Review of system  Review of Systems   Neurological: Positive for numbness.   All other systems reviewed and are negative.      Vitals:    11/08/19 1402   BP: 154/77   Pulse: 97   Resp: 18   Temp: 97 °F (36.1 °C)   SpO2:        Physical exam  Physical Exam   Constitutional: He is oriented to person, place, and time. He appears well-developed and well-nourished.   HENT:   Head: Normocephalic  and atraumatic.   Eyes: EOM are normal. Pupils are equal, round, and reactive to light.   Cardiovascular: Normal rate and regular rhythm.   Pulmonary/Chest: Effort normal.   Neurological: He is alert and oriented to person, place, and time. He has normal strength and normal reflexes. He displays normal reflexes. No cranial nerve deficit or sensory deficit. He exhibits normal muscle tone. Coordination normal. He displays no Babinski's sign on the right side. He displays no Babinski's sign on the left side.   Trace facial asymmetry, speech clear, VFF, no pronator drift.   Psychiatric: He has a normal mood and affect. His behavior is normal. Thought content normal.         Lab Results   Component Value Date    WBC 12.28 (H) 11/07/2019    HGB 16.8 11/07/2019    HCT 50.5 11/07/2019    MCV 89.9 11/07/2019     11/07/2019     Lab Results   Component Value Date    GLUCOSE 225 (H) 11/07/2019    BUN 14 11/07/2019    CREATININE 0.99 11/07/2019    EGFRIFNONA 81 11/07/2019    EGFRIFAFRI 98 11/07/2019    BCR 14.1 11/07/2019    CO2 27.4 11/07/2019    CALCIUM 9.2 11/07/2019     Lipid Panel   Order: [id number]   Status:  Final result   Visible to patient:  No (Not Released)   Next appt:  11/21/2019 at 02:00 PM in Family [facility] (Lanie Resendez, APRCARLOS)   Component  Ref Range & Units 04:04   Total Cholesterol  0 - 200 mg/dL 166    Triglycerides  0 - 150 mg/dL 177 Abnormally high     HDL Cholesterol  40 - 60 mg/dL 31 Abnormally low     LDL Cholesterol   0 - 100 mg/dL 100    VLDL Cholesterol  5 - 40 mg/dL 35.4    LDL/HDL Ratio 3.21            Hemoglobin A1C  4.80 - 5.60 % 10.40 Abnormally high           ECG 12 Lead   Order: [id number]   Status:  Final result   Visible to patient:  No (Not Released) Next appt:  11/21/2019 at 02:00 PM in Family Medicine (Lanie Resendez, APRCARLOS)      Narrative     HEART RATE= 107  bpm  RR Interval= 560  ms  AL Interval= 157  ms  P Horizontal Axis= 13  deg  P Front Axis= 43  deg  QRSD Interval= 83  ms  QT  Interval= 329  ms  QRS Axis= 59  deg  T Wave Axis= -28  deg  - BORDERLINE ECG -  Sinus tachycardia  Borderline T abnormalities, inferior leads  No change from prior tracing  Electronically Signed By: Jose AlcazarKEITH) ([facility]) 2019 17:39:00  Date and Time of Study: 2019 16:41:45      Specimen Collected: 19 16:41 Last Resulted: 19 17:39           Echocardiogram   Order# [id number]   Reading physician: Cristofer Youngblood MD Ordering physician: Pete Daniels MD Study date: 19   Patient Information     Patient Name  Roni Villaseñor MRN  [id number] Sex  Male  (Age)  1970 (49 y.o.)   Admission Information     Admission Date/Time Discharge Date/Time Room/Bed   19  1556  P580/1   Sedation Narrator Report     Sedation Narrator Report   Interpretation Summary     · Estimated EF appears to be in the range of 56 - 60%  · Left ventricular wall thickness is consistent with mild concentric hypertrophy.  · Left ventricular diastolic function is normal.  · Normal right ventricular cavity size and systolic function noted.  · Saline test results are negative.  · Mild aortic valve regurgitation is present.  · Calculated right ventricular systolic pressure from tricuspid regurgitation is 19 mmHg.  · There is no evidence of pericardial effusion.         Radiological Studies:    Xr Chest 2 View    Result Date: 2019  XR CHEST 2 VW-  HISTORY: Male who is 49 years-old,  numbness of the right hand  TECHNIQUE: Frontal and lateral views of the chest  COMPARISON: None available  FINDINGS: Heart, mediastinum and pulmonary vasculature are unremarkable. No focal pulmonary consolidation, pleural effusion, or pneumothorax. No acute osseous process.      No evidence for acute pulmonary process. Follow-up as clinical indications persist.  This report was finalized on 2019 4:42 PM by Dr. Mal Leos M.D.      Ct Angiogram Neck    Result Date: 2019  NONCONTRAST CRANIAL CT SCAN, CT  ANGIOGRAM NECK, CT ANGIOGRAM HEAD.  HISTORY: Acute CVA,  COMPARISON: None..  TECHNIQUE:  Radiation dose reduction techniques were utilized, including automated exposure control and exposure modulation based on body size. Initially, a routine noncontrast cranial CT performed from the skull base through the vertex region. After review, thin-section contrast enhanced CT angiogram images obtained from the aortic arch through the calvarial vertex utilizing angiographic technique. Multi projection 3-D MIP reformatted images were supplemented and reviewed.   FINDINGS CRANIAL CT: No acute hemorrhage or midline shift is demonstrated..  Ventricular size and configuration is within normal limits for age..  There is some periventricular and deep white matter microangiopathic change. It is relatively mild.  Bone window images partial opacification of the ethmoid sinuses.. Post contrast imaging does not demonstrate any suspicious enhancement. There is no evidence of venous occlusion. Suspected old lacunar infarct is seen within the left thalamus. This is better seen on the earlier MRI.   CERVICAL CAROTID CT ANGIOGRAM:  FINDINGS: There is normal arch anatomy. There is minimal plaque of the aortic arch. There is mild diffuse plaque of both common carotid arteries, without flow-limiting stenosis. Both cervical internal carotid arteries are widely patent. The vertebral arteries are unremarkable bilaterally.  Images through the lung apices do not demonstrate any acute abnormalities. The thyroid gland and trachea appear unremarkable. No suspicious cervical adenopathy is seen. No acute osseous abnormalities are identified.   NASCET criteria utilized in stenosis measurements. Stenosis mild, 0-49%.  CRANIAL CTA ANGIOGRAM:  FINDINGS:  The intracranial internal carotid arteries demonstrate mild diffuse disease within the cavernous segments. Both middle cerebral arteries are patent, although there may be mild narrowing involving the proximal  left M1, in the range of 30%. There is a tiny anterior communicating artery. The anterior cerebral arteries appear unremarkable.   The basilar artery is normal in caliber, and the posterior cerebral arteries appear unremarkable..          1. No acute intracranial process identified. 2. No cervical vascular stenosis or occlusion. 3. Mild focal stenosis of the proximal left M1. Otherwise, no intracranial stenosis or occlusion identified.   Radiation dose reduction techniques were utilized, including automated exposure control and exposure modulation based on body size.  This report was finalized on 11/8/2019 1:00 AM by Dr. Lis Cisneros M.D.      Mri Brain Without Contrast    Result Date: 11/7/2019  MRI EXAMINATION OF THE BRAIN WITHOUT CONTRAST  HISTORY: Right arm, face and neck pain.  COMPARISON: None.  TECHNIQUE: A noncontrasted MRI examination of the brain was performed utilizing sagittal T1, axial diffusion, T1, T2, T2 FLAIR and gradient echo T2 imaging.  FINDINGS: There is no evidence of restricted diffusion to suggest acute infarction. There is a mild degree of increased signal intensity involving the periventricular white matter of the cerebral hemispheres bilaterally on the axial T2 FLAIR sequence. A small lacunar infarct involving the thalamus on the left is appreciated measuring approximately 8 to 9 mm in size. There is expected flow-void in the basilar artery and in the distal aspect of the internal carotid arteries bilaterally on the axial T2 sequence. There is mild mucosal thickening involving the ethmoid air cells bilaterally.      No evidence of restricted diffusion. An area of T2 FLAIR hyperintensity is identified involving the thalamus on the left laterally measuring 8 to 9 mm in size. There is no evidence of restricted diffusion but restricted diffusion typically persists for only 10-14 days following an acute infarct. Reportedly the patient has been symptomatic greater than 10-14 days. This may  represent a subacute infarct. There is mild small vessel ischemic disease, slightly more prominent for the patient's age. Clinical correlation is recommended. There is no evidence of mass or of mass effect on this noncontrasted MRI examination of the brain.  The above information was called to Dr. Pereira at the time of the dictation.  This report was finalized on 11/7/2019 7:52 PM by Dr. Pete Velazquez M.D.      Ct Angiogram Head    Result Date: 11/8/2019  NONCONTRAST CRANIAL CT SCAN, CT ANGIOGRAM NECK, CT ANGIOGRAM HEAD.  HISTORY: Acute CVA,  COMPARISON: None..  TECHNIQUE:  Radiation dose reduction techniques were utilized, including automated exposure control and exposure modulation based on body size. Initially, a routine noncontrast cranial CT performed from the skull base through the vertex region. After review, thin-section contrast enhanced CT angiogram images obtained from the aortic arch through the calvarial vertex utilizing angiographic technique. Multi projection 3-D MIP reformatted images were supplemented and reviewed.   FINDINGS CRANIAL CT: No acute hemorrhage or midline shift is demonstrated..  Ventricular size and configuration is within normal limits for age..  There is some periventricular and deep white matter microangiopathic change. It is relatively mild.  Bone window images partial opacification of the ethmoid sinuses.. Post contrast imaging does not demonstrate any suspicious enhancement. There is no evidence of venous occlusion. Suspected old lacunar infarct is seen within the left thalamus. This is better seen on the earlier MRI.   CERVICAL CAROTID CT ANGIOGRAM:  FINDINGS: There is normal arch anatomy. There is minimal plaque of the aortic arch. There is mild diffuse plaque of both common carotid arteries, without flow-limiting stenosis. Both cervical internal carotid arteries are widely patent. The vertebral arteries are unremarkable bilaterally.  Images through the lung apices do not  demonstrate any acute abnormalities. The thyroid gland and trachea appear unremarkable. No suspicious cervical adenopathy is seen. No acute osseous abnormalities are identified.   NASCET criteria utilized in stenosis measurements. Stenosis mild, 0-49%.  CRANIAL CTA ANGIOGRAM:  FINDINGS:  The intracranial internal carotid arteries demonstrate mild diffuse disease within the cavernous segments. Both middle cerebral arteries are patent, although there may be mild narrowing involving the proximal left M1, in the range of 30%. There is a tiny anterior communicating artery. The anterior cerebral arteries appear unremarkable.   The basilar artery is normal in caliber, and the posterior cerebral arteries appear unremarkable..          1. No acute intracranial process identified. 2. No cervical vascular stenosis or occlusion. 3. Mild focal stenosis of the proximal left M1. Otherwise, no intracranial stenosis or occlusion identified.   Radiation dose reduction techniques were utilized, including automated exposure control and exposure modulation based on body size.  This report was finalized on 11/8/2019 1:00 AM by Dr. Lis Cisneros M.D.          During this visit the following were done:  Labs Reviewed [x]    Labs Ordered []    Radiology Reports Reviewed [x]    Radiology Ordered []    EKG, echo, and/or stress test reviewed [x]    EEG results reviewed  []    EEG reviewed and interpreted per myself   []    Discussed case with neurointerventionalist or neuroradiologist []    Referring Provider Records Reviewed []    ER Records Reviewed []    Hospital Records Reviewed [x]    History Obtained From Family []    Radiological images view and Interpreted per myself [x]    Case Discussed with referring provider []     Decision to obtain and request outside records  []        Assessment and Plan     Subacute left thalamic lacunar stroke 22 small vessel thrombosis, uncontrolled risk factors including HTN, BS, smoking. Not a TPA or  intervention candidate on time criteria.   - continue aspirin 81 mg   - statin.   - BP control, SBP<130, DBP<80.   - BS control, A1C<7.   - stop smoking.   - report new symptoms immediately   - F/U w primary provider   - outpatient neurology F/U optional      Thanks,        Electronically signed by Jose Angel Franco MD on 11/8/2019 at 3:29 PM

## 2019-11-08 NOTE — H&P
"      Patient Name:  Roni Villaseñor  YOB: 1970  MRN:  [id number]  Admit Date:  11/7/2019  Patient Care Team:  Lanie Resendez APRN as PCP - General (Nurse Practitioner)      Subjective   History Present Illness     Chief Complaint   Patient presents with   • Numbness     Right sided       Mr. Villaseñor is a 49 y.o. smoker with history of elevated blood pressure that presents to [facility] complaining of weakness and numbness to the right side of his body.  He reports that 3 weeks ago, he was helping a client install equipment and \"tweaked\" his back.  He states that when he went home that night, he began experiencing numbness in the right hand, but he believed the symptoms were related to injuring his back.  He states that when he woke up the next day, his entire right side from the face to his leg felt numb.  He reports difficulty writing and typing with the right hand.  He denies headache, facial asymmetry, and blurred vision. He denies chest pain, shortness of breath, and orthopnea.  He denies speech difficulty and weakness. Work up in the ED revealed glucose 225, Mg 2.9, and WBC 12.28.  Chest x-ray was negative.  EKG showed sinus tachycardia.  MRI shows an area of hyperintensity involving the thalamus on the left laterally, measuring 8 to 9 mm in size, suggestive of a subacute infarct.  He received a dose of Aspirin, Chlorthalidone PO, and a fluid bolus in the ED.           History of Present Illness  Review of Systems   Constitutional: Negative.    HENT: Negative.    Eyes: Negative.    Respiratory: Negative.    Cardiovascular: Negative.    Gastrointestinal: Negative.    Genitourinary: Negative.    Musculoskeletal: Negative.    Skin: Negative.    Neurological: Positive for numbness (Right-sided).   Psychiatric/Behavioral: Negative.         Personal History     Past Medical History:   Diagnosis Date   • Abnormal blood sugar    • Abnormal CBC    • Abnormal EKG    • Abnormal nuclear stress test " 06/28/2017    Previous inferior infarct; no ischemia noted   • Arm bruise     scab on left knee   • TORRES (dyspnea on exertion)    • Fracture of elbow     left   • Heart murmur    • Malaise and fatigue    • Transient elevated blood pressure      Past Surgical History:   Procedure Laterality Date   • NO PAST SURGERIES     • TOTAL ELBOW ARTHROPLASTY Left 6/29/2017    Procedure: RADIAL HEAD ARTHROPLASTY AND LATERAL COLLATERAL LIGAMENT REPAIR;  Surgeon: Dmitry Davila MD;  Location: [facility];  Service:      Family History   Problem Relation Age of Onset   • Hypertension Father    • Hypertension Paternal Uncle      Social History     Tobacco Use   • Smoking status: Current Every Day Smoker     Packs/day: 0.50     Years: 10.00     Pack years: 5.00     Types: Cigarettes, Electronic Cigarette   • Smokeless tobacco: Never Used   • Tobacco comment: electronic cigarette nicotine 2 amps last 2 weeks   Substance Use Topics   • Alcohol use: Yes     Comment: rarely/  Daily caffeine use   • Drug use: Yes     Types: Marijuana     Comment: not regular basis     Medications Prior to Admission   Medication Sig Dispense Refill Last Dose   • lisinopril (PRINIVIL,ZESTRIL) 20 MG tablet Take 1 tablet by mouth Daily. 30 tablet 1    • naproxen sodium (ALEVE) 220 MG tablet Take 220 mg by mouth 2 (Two) Times a Day As Needed for Mild Pain (1-3).   Taking     Allergies:  No Known Allergies    Objective    Objective     Vital Signs  Temp:  [98.3 °F (36.8 °C)-98.6 °F (37 °C)] 98.3 °F (36.8 °C)  Heart Rate:  [] 89  Resp:  [18] 18  BP: (150-193)/() 176/106  SpO2:  [95 %-100 %] 99 %  on   ;   Device (Oxygen Therapy): room air  Body mass index is 30.26 kg/m².    Physical Exam   Constitutional: He is oriented to person, place, and time. He appears well-developed and well-nourished.   HENT:   Head: Normocephalic and atraumatic.   Eyes: Conjunctivae are normal. Pupils are equal, round, and reactive to light.   Neck: Normal range of  motion. Neck supple.   Cardiovascular: Normal rate, regular rhythm and normal heart sounds.   No murmur heard.  Pulmonary/Chest: Effort normal and breath sounds normal.   Abdominal: Soft. Bowel sounds are normal.   Musculoskeletal: Normal range of motion.   Neurological: He is alert and oriented to person, place, and time.   Skin: Skin is warm and dry.   Psychiatric: He has a normal mood and affect. His behavior is normal.   Nursing note and vitals reviewed.      Results Review:  I reviewed the patient's new clinical results.  I reviewed the patient's new imaging results and agree with the interpretation.  I reviewed the patient's other test results and agree with the interpretation  I personally viewed and interpreted the patient's EKG/Telemetry data  Discussed with ED provider.    Lab Results (last 24 hours)     Procedure Component Value Units Date/Time    CBC & Differential [[id number]] Collected:  11/07/19 1700    Specimen:  Blood Updated:  11/07/19 1719    Narrative:       The following orders were created for panel order CBC & Differential.  Procedure                               Abnormality         Status                     ---------                               -----------         ------                     CBC Auto Differential[[id number]]        Abnormal            Final result                 Please view results for these tests on the individual orders.    Basic Metabolic Panel [[id number]]  (Abnormal) Collected:  11/07/19 1700    Specimen:  Blood Updated:  11/07/19 1732     Glucose 225 mg/dL      BUN 14 mg/dL      Creatinine 0.99 mg/dL      Sodium 143 mmol/L      Potassium 4.0 mmol/L      Chloride 104 mmol/L      CO2 27.4 mmol/L      Calcium 9.2 mg/dL      eGFR  African Amer 98 mL/min/1.73      eGFR Non African Amer 81 mL/min/1.73      BUN/Creatinine Ratio 14.1     Anion Gap 11.6 mmol/L     Narrative:       GFR Normal >60  Chronic Kidney Disease <60  Kidney Failure <15    TSH [[id number]]  (Normal)  Collected:  11/07/19 1700    Specimen:  Blood Updated:  11/07/19 1739     TSH 1.150 uIU/mL     T4, Free [[id number]]  (Normal) Collected:  11/07/19 1700    Specimen:  Blood Updated:  11/07/19 1739     Free T4 1.51 ng/dL     Magnesium [[id number]]  (Abnormal) Collected:  11/07/19 1700    Specimen:  Blood Updated:  11/07/19 1732     Magnesium 2.9 mg/dL     Phosphorus [[id number]]  (Normal) Collected:  11/07/19 1700    Specimen:  Blood Updated:  11/07/19 1732     Phosphorus 3.0 mg/dL     CBC Auto Differential [[id number]]  (Abnormal) Collected:  11/07/19 1700    Specimen:  Blood Updated:  11/07/19 1719     WBC 12.28 10*3/mm3      RBC 5.62 10*6/mm3      Hemoglobin 16.8 g/dL      Hematocrit 50.5 %      MCV 89.9 fL      MCH 29.9 pg      MCHC 33.3 g/dL      RDW 12.9 %      RDW-SD 42.1 fl      MPV 9.6 fL      Platelets 306 10*3/mm3      Neutrophil % 63.6 %      Lymphocyte % 26.8 %      Monocyte % 6.5 %      Eosinophil % 2.2 %      Basophil % 0.7 %      Immature Grans % 0.2 %      Neutrophils, Absolute 7.80 10*3/mm3      Lymphocytes, Absolute 3.29 10*3/mm3      Monocytes, Absolute 0.80 10*3/mm3      Eosinophils, Absolute 0.27 10*3/mm3      Basophils, Absolute 0.09 10*3/mm3      Immature Grans, Absolute 0.03 10*3/mm3      nRBC 0.0 /100 WBC           Imaging Results (Last 24 Hours)     Procedure Component Value Units Date/Time    MRI Brain Without Contrast [[id number]] Collected:  11/07/19 1850     Updated:  11/07/19 1955    Narrative:       MRI EXAMINATION OF THE BRAIN WITHOUT CONTRAST     HISTORY: Right arm, face and neck pain.     COMPARISON: None.     TECHNIQUE: A noncontrasted MRI examination of the brain was performed  utilizing sagittal T1, axial diffusion, T1, T2, T2 FLAIR and gradient  echo T2 imaging.     FINDINGS: There is no evidence of restricted diffusion to suggest acute  infarction. There is a mild degree of increased signal intensity  involving the periventricular white matter of the cerebral  hemispheres  bilaterally on the axial T2 FLAIR sequence. A small lacunar infarct  involving the thalamus on the left is appreciated measuring  approximately 8 to 9 mm in size. There is expected flow-void in the  basilar artery and in the distal aspect of the internal carotid arteries  bilaterally on the axial T2 sequence. There is mild mucosal thickening  involving the ethmoid air cells bilaterally.       Impression:       No evidence of restricted diffusion. An area of T2 FLAIR  hyperintensity is identified involving the thalamus on the left  laterally measuring 8 to 9 mm in size. There is no evidence of  restricted diffusion but restricted diffusion typically persists for  only 10-14 days following an acute infarct. Reportedly the patient has  been symptomatic greater than 10-14 days. This may represent a subacute  infarct. There is mild small vessel ischemic disease, slightly more  prominent for the patient's age. Clinical correlation is recommended.  There is no evidence of mass or of mass effect on this noncontrasted MRI  examination of the brain.     The above information was called to Dr. Pereira at the time of the  dictation.     This report was finalized on 11/7/2019 7:52 PM by Dr. Pete Velazquez M.D.       XR Chest 2 View [[id number]] Collected:  11/07/19 1641     Updated:  11/07/19 1645    Narrative:       XR CHEST 2 VW-     HISTORY: Male who is 49 years-old,  numbness of the right hand     TECHNIQUE: Frontal and lateral views of the chest     COMPARISON: None available     FINDINGS: Heart, mediastinum and pulmonary vasculature are unremarkable.  No focal pulmonary consolidation, pleural effusion, or pneumothorax. No  acute osseous process.       Impression:       No evidence for acute pulmonary process. Follow-up as  clinical indications persist.     This report was finalized on 11/7/2019 4:42 PM by Dr. Mal Leos M.D.             Results for orders placed during the hospital encounter of 06/28/17    Adult Transthoracic Echo Complete With Contrast    Narrative · Left ventricular systolic function is normal. Calculated EF = 68%.   Estimated EF was in agreement with the calculated EF. Normal left   ventricular cavity size and wall thickness noted. All left ventricular   wall segments contract normally. Left ventricular diastolic function is   normal.          ECG 12 Lead   Final Result   HEART RATE= 107  bpm   RR Interval= 560  ms   MS Interval= 157  ms   P Horizontal Axis= 13  deg   P Front Axis= 43  deg   QRSD Interval= 83  ms   QT Interval= 329  ms   QRS Axis= 59  deg   T Wave Axis= -28  deg   - BORDERLINE ECG -   Sinus tachycardia   Borderline T abnormalities, inferior leads   No change from prior tracing   Electronically Signed By: Jose Alcazar) ([facility]) 07-Nov-2019 17:39:00   Date and Time of Study: 2019-11-07 16:41:45           Assessment/Plan     Active Hospital Problems    Diagnosis POA   • **Cerebrovascular accident (CVA) (CMS/[facility]) [I63.9] Yes   • Tobacco abuse [Z72.0] Unknown   • HTN (hypertension) [I10] Unknown     Subacute CVA  -Hyperintensity in the left lateral lacunar area on MRI, measuring 8 to 9 mm  -C/o numbness to the right side, but no other focal neuro deficits on exam  -Stroke work up with CT angiogram of head and neck, Echo  -Neurology consult  -Neuro checks/NIHSS  -Check hgb A1C and lipid panel in the AM  -PT/OT/SP consults  -Hold blood pressure medications to allow for permissive hypertension. Monitor blood pressures closely  -Daily Aspirin and Atorvastatin    -I discussed the patients findings and my recommendations with patient.    VTE Prophylaxis - SCDs.  Code Status - Full code.       ANTONELLA Kern  [facility]  11/07/19  9:53 PM

## 2019-11-08 NOTE — DISCHARGE SUMMARY
NAME: Roni Villaseñor ADMIT: 2019   : 1970  PCP: Lanie Resendez APRN    MRN: [id number] LOS: 1 days   AGE/SEX: 49 y.o. male  ROOM: P580/1     Date of Admission:  2019  Date of Discharge:  2019    PCP: Lanie Resendez APRN    CHIEF COMPLAINT  Numbness (Right sided)      DISCHARGE DIAGNOSIS  Active Hospital Problems    Diagnosis  POA   • **Cerebrovascular accident (CVA) (CMS/Formerly [facility]) [I63.9]  Yes   • Type 2 diabetes mellitus with hyperglycemia (CMS/Formerly [facility]) [E11.65]  Yes   • HLD (hyperlipidemia) [E78.5]  Yes   • Tobacco abuse [Z72.0]  Yes   • HTN (hypertension) [I10]  Yes      Resolved Hospital Problems   No resolved problems to display.       SECONDARY DIAGNOSES  Past Medical History:   Diagnosis Date   • Abnormal blood sugar    • Abnormal CBC    • Abnormal EKG    • Abnormal nuclear stress test 2017    Previous inferior infarct; no ischemia noted   • Arm bruise     scab on left knee   • Bronchitis     college years   • TORRES (dyspnea on exertion)    • Fracture of elbow     left   • Heart murmur    • HLD (hyperlipidemia) 2019   • Malaise and fatigue    • Transient elevated blood pressure        HOSPITAL COURSE  Patient is a 49 y.o. male presented to [facility] complaining of right sided numbness.  Please see the admitting history and physical for further details.      He was admitted and found to have acute versus stroke.  Work-up including MRI, CTA of the head and neck as well as telemetry monitoring and 2D echo showed the likely cause of this is secondary to tobacco use, uncontrolled hypertension as well as diabetes which is a new diagnosis the patient.  He also is not taking any daily aspirin.  He will be discharged on daily aspirin, increase in blood pressure medicine with adding amlodipine with further titration by his primary care provider as an outpatient.  Also have started him on glipizide as well as then metformin-the metformin will be started 48 hours after his IV  contrast.  Started on atorvastatin.  The patient feels improved and likely can be discharged later this afternoon. Discussed with him smoking cessation.         DIAGNOSTICS  2D ECHO 11/8/19  · Estimated EF appears to be in the range of 56 - 60%  · Left ventricular wall thickness is consistent with mild concentric hypertrophy.  · Left ventricular diastolic function is normal.  · Normal right ventricular cavity size and systolic function noted.  · Saline test results are negative.  · Mild aortic valve regurgitation is present.  · Calculated right ventricular systolic pressure from tricuspid regurgitation is 19 mmHg.  · There is no evidence of pericardial effusion.       CT Angiogram Head [[id number]] Indra as Reviewed   Order Status: Completed Collected: 11/08/19 0045    Updated: 11/08/19 0104   Narrative:     NONCONTRAST CRANIAL CT SCAN, CT ANGIOGRAM NECK, CT ANGIOGRAM HEAD.     HISTORY: Acute CVA,      COMPARISON: None..     TECHNIQUE:  Radiation dose reduction techniques were utilized, including  automated exposure control and exposure modulation based on body size.   Initially, a routine noncontrast cranial CT performed from the skull  base through the vertex region. After review, thin-section contrast  enhanced CT angiogram images obtained from the aortic arch through the  calvarial vertex utilizing angiographic technique. Multi projection 3-D  MIP reformatted images were supplemented and reviewed.        FINDINGS CRANIAL CT: No acute hemorrhage or midline shift is  demonstrated..  Ventricular size and configuration is within normal  limits for age..  There is some periventricular and deep white matter  microangiopathic change. It is relatively mild.  Bone window images  partial opacification of the ethmoid sinuses.. Post contrast imaging  does not demonstrate any suspicious enhancement. There is no evidence of  venous occlusion. Suspected old lacunar infarct is seen within the left  thalamus. This is better seen  on the earlier MRI.        CERVICAL CAROTID CT ANGIOGRAM:     FINDINGS: There is normal arch anatomy. There is minimal plaque of the  aortic arch. There is mild diffuse plaque of both common carotid  arteries, without flow-limiting stenosis. Both cervical internal carotid  arteries are widely patent. The vertebral arteries are unremarkable  bilaterally.     Images through the lung apices do not demonstrate any acute  abnormalities. The thyroid gland and trachea appear unremarkable. No  suspicious cervical adenopathy is seen. No acute osseous abnormalities  are identified.        NASCET criteria utilized in stenosis measurements. Stenosis mild, 0-49%.     CRANIAL CTA ANGIOGRAM:     FINDINGS:  The intracranial internal carotid arteries demonstrate mild  diffuse disease within the cavernous segments. Both middle cerebral  arteries are patent, although there may be mild narrowing involving the  proximal left M1, in the range of 30%. There is a tiny anterior  communicating artery. The anterior cerebral arteries appear  unremarkable.   The basilar artery is normal in caliber, and the  posterior cerebral arteries appear unremarkable..                 Impression:     1. No acute intracranial process identified.  2. No cervical vascular stenosis or occlusion.  3. Mild focal stenosis of the proximal left M1. Otherwise, no  intracranial stenosis or occlusion identified.        Radiation dose reduction techniques were utilized, including automated  exposure control and exposure modulation based on body size.     This report was finalized on 11/8/2019 1:00 AM by Dr. Lis Cisneros M.D.      CT Angiogram Neck [[id number]] Indra as Reviewed   Order Status: Completed Collected: 11/08/19 0045    Updated: 11/08/19 0104   Narrative:     NONCONTRAST CRANIAL CT SCAN, CT ANGIOGRAM NECK, CT ANGIOGRAM HEAD.     HISTORY: Acute CVA,      COMPARISON: None..     TECHNIQUE:  Radiation dose reduction techniques were utilized,  including  automated exposure control and exposure modulation based on body size.   Initially, a routine noncontrast cranial CT performed from the skull  base through the vertex region. After review, thin-section contrast  enhanced CT angiogram images obtained from the aortic arch through the  calvarial vertex utilizing angiographic technique. Multi projection 3-D  MIP reformatted images were supplemented and reviewed.        FINDINGS CRANIAL CT: No acute hemorrhage or midline shift is  demonstrated..  Ventricular size and configuration is within normal  limits for age..  There is some periventricular and deep white matter  microangiopathic change. It is relatively mild.  Bone window images  partial opacification of the ethmoid sinuses.. Post contrast imaging  does not demonstrate any suspicious enhancement. There is no evidence of  venous occlusion. Suspected old lacunar infarct is seen within the left  thalamus. This is better seen on the earlier MRI.        CERVICAL CAROTID CT ANGIOGRAM:     FINDINGS: There is normal arch anatomy. There is minimal plaque of the  aortic arch. There is mild diffuse plaque of both common carotid  arteries, without flow-limiting stenosis. Both cervical internal carotid  arteries are widely patent. The vertebral arteries are unremarkable  bilaterally.     Images through the lung apices do not demonstrate any acute  abnormalities. The thyroid gland and trachea appear unremarkable. No  suspicious cervical adenopathy is seen. No acute osseous abnormalities  are identified.        NASCET criteria utilized in stenosis measurements. Stenosis mild, 0-49%.     CRANIAL CTA ANGIOGRAM:     FINDINGS:  The intracranial internal carotid arteries demonstrate mild  diffuse disease within the cavernous segments. Both middle cerebral  arteries are patent, although there may be mild narrowing involving the  proximal left M1, in the range of 30%. There is a tiny anterior  communicating artery. The  anterior cerebral arteries appear  unremarkable.   The basilar artery is normal in caliber, and the  posterior cerebral arteries appear unremarkable..                 Impression:     1. No acute intracranial process identified.  2. No cervical vascular stenosis or occlusion.  3. Mild focal stenosis of the proximal left M1. Otherwise, no  intracranial stenosis or occlusion identified.        Radiation dose reduction techniques were utilized, including automated  exposure control and exposure modulation based on body size.     This report was finalized on 11/8/2019 1:00 AM by Dr. Lis Cisneros M.D.      MRI Brain Without Contrast [[id number]] Indra as Reviewed   Order Status: Completed Collected: 11/07/19 1850    Updated: 11/07/19 1955   Narrative:     MRI EXAMINATION OF THE BRAIN WITHOUT CONTRAST     HISTORY: Right arm, face and neck pain.     COMPARISON: None.     TECHNIQUE: A noncontrasted MRI examination of the brain was performed  utilizing sagittal T1, axial diffusion, T1, T2, T2 FLAIR and gradient  echo T2 imaging.     FINDINGS: There is no evidence of restricted diffusion to suggest acute  infarction. There is a mild degree of increased signal intensity  involving the periventricular white matter of the cerebral hemispheres  bilaterally on the axial T2 FLAIR sequence. A small lacunar infarct  involving the thalamus on the left is appreciated measuring  approximately 8 to 9 mm in size. There is expected flow-void in the  basilar artery and in the distal aspect of the internal carotid arteries  bilaterally on the axial T2 sequence. There is mild mucosal thickening  involving the ethmoid air cells bilaterally.      Impression:     No evidence of restricted diffusion. An area of T2 FLAIR  hyperintensity is identified involving the thalamus on the left  laterally measuring 8 to 9 mm in size. There is no evidence of  restricted diffusion but restricted diffusion typically persists for  only 10-14 days following  an acute infarct. Reportedly the patient has  been symptomatic greater than 10-14 days. This may represent a subacute  infarct. There is mild small vessel ischemic disease, slightly more  prominent for the patient's age. Clinical correlation is recommended.  There is no evidence of mass or of mass effect on this noncontrasted MRI  examination of the brain.     The above information was called to Dr. Pereira at the time of the  dictation.     This report was finalized on 11/7/2019 7:52 PM by Dr. Pete Velazquez M.D.      XR Chest 2 View [[id number]] Indra as Reviewed   Order Status: Completed Collected: 11/07/19 1641    Updated: 11/07/19 1645   Narrative:     XR CHEST 2 VW-     HISTORY: Male who is 49 years-old,  numbness of the right hand     TECHNIQUE: Frontal and lateral views of the chest     COMPARISON: None available     FINDINGS: Heart, mediastinum and pulmonary vasculature are unremarkable.  No focal pulmonary consolidation, pleural effusion, or pneumothorax. No  acute osseous process.      Impression:     No evidence for acute pulmonary process. Follow-up as  clinical indications persist.          11/08/2019 0404  11/08/2019 0555  Hemoglobin A1c [[id number]]    (Abnormal)   Blood     Final result  Hemoglobin A1C 10.40 Abnormally high  %          11/08/2019 0404  11/08/2019 0629  Lipid Panel [[id number]]    (Abnormal)   Blood     Final result  Total Cholesterol 166 mg/dL   Triglycerides 177 Abnormally high  mg/dL   HDL Cholesterol 31 Abnormally low  mg/dL   LDL Cholesterol  100 mg/dL   VLDL Cholesterol 35.4 mg/dL   LDL/HDL Ratio 3.21           11/07/2019 1700  11/07/2019 1732  Basic Metabolic Panel [[id number]]    (Abnormal)   Blood     Final result  Glucose 225 Abnormally high  mg/dL   BUN 14 mg/dL   Creatinine 0.99 mg/dL   Sodium 143 mmol/L   Potassium 4.0 mmol/L   Chloride 104 mmol/L   CO2 27.4 mmol/L   Calcium 9.2 mg/dL   eGFR African Am 98 mL/min/1.73   eGFR Non African Am 81 mL/min/1.73   BUN/Creatinine Ratio  14.1    Anion Gap 11.6 mmol/L          11/07/2019 1700  11/07/2019 1739  TSH [[id number]]   Blood     Final result  TSH Baseline 1.150 uIU/mL          11/07/2019 1700  11/07/2019 1739  T4, Free [[id number]]   Blood     Final result  Free T4 1.51 ng/dL          11/07/2019 1700  11/07/2019 1732  Magnesium [[id number]]   (Abnormal)   Blood     Final result  Magnesium 2.9 Abnormally high           PHYSICAL EXAM  Objective    Alert  nad  No resp distress  CN grossly intact  RRR, + murmur    CONDITION ON DISCHARGE  Stable.      DISCHARGE DISPOSITION         DISCHARGE MEDICATIONS       Your medication list      START taking these medications      Instructions Last Dose Given Next Dose Due   amLODIPine 5 MG tablet  Commonly known as:  NORVASC  Start taking on:  11/9/2019      Take 1 tablet by mouth Daily.       aspirin 81 MG tablet      Take 1 tablet by mouth Daily.       atorvastatin 80 MG tablet  Commonly known as:  LIPITOR      Take 1 tablet by mouth Every Night.       glipizide 5 MG tablet  Commonly known as:  GLUCOTROL  Start taking on:  11/9/2019      Take 1 tablet by mouth Every Morning Before Breakfast.       metFORMIN 500 MG tablet  Commonly known as:  GLUCOPHAGE  Start taking on:  11/11/2019      Take 1 tablet by mouth 2 (Two) Times a Day With Meals. Start taking on 11/11          CONTINUE taking these medications      Instructions Last Dose Given Next Dose Due   lisinopril 20 MG tablet  Commonly known as:  PRINIVIL,ZESTRIL      Take 1 tablet by mouth Daily.          STOP taking these medications    naproxen sodium 220 MG tablet  Commonly known as:  ALEVE              Where to Get Your Medications      These medications were sent to [facility] [address] [place] [address] [address] AT [facility] - [phone] [facility] [phone]   [address] [facility], [facility] [address]    Phone:  [phone]   · amLODIPine 5 MG tablet  · atorvastatin 80 MG tablet  · glipizide 5 MG  tablet  · metFORMIN 500 MG tablet     Information about where to get these medications is not yet available    Ask your nurse or doctor about these medications  · aspirin 81 MG tablet          Future Appointments   Date Time Provider Department Center   11/21/2019  2:00 PM Lanie Resendez APRN [facility] None       TEST  RESULTS PENDING AT DISCHARGE         See Carr MD  [facility]  11/08/19  3:04 PM      Time: greater than 32 minutes on discharge  It was a pleasure taking care of this patient while in the hospital.

## 2019-11-08 NOTE — PLAN OF CARE
Problem: Patient Care Overview  Goal: Plan of Care Review  Outcome: Ongoing (interventions implemented as appropriate)   11/08/19 0557   Coping/Psychosocial   Plan of Care Reviewed With patient   Plan of Care Review   Progress no change   OTHER   Outcome Summary Pt admitted for stroke. c/o numbness to arm, face and leg for 3 wks. Went to immediate care for uncontrolled htn and sent to ER for further workup of numbness. CVA found on MRI. NIH 0. Numbness to right side persists but no deficits noted. CTA head/neck done. MIVF infusing. Pt able to ambulate to bathroom. Passed dysphagia screening. BP improved. NAD.

## 2019-11-08 NOTE — PROGRESS NOTES
Pt with stroke. Full note to follow. Suspect treatment will be with ASA, statin, and improved DM/HTN control as well as needs to stop smoking. Will follow up on Neurology recommendations

## 2019-11-09 ENCOUNTER — READMISSION MANAGEMENT (OUTPATIENT)
Dept: CALL CENTER | Facility: HOSPITAL | Age: 49
End: 2019-11-09

## 2019-11-09 NOTE — OUTREACH NOTE
Prep Survey      Responses   Facility patient discharged from?  [place]   Is patient eligible?  Yes   Discharge diagnosis  Cerebrovascular accident (CVA   Does the patient have one of the following disease processes/diagnoses(primary or secondary)?  Stroke (TIA)   Does the patient have Home health ordered?  No   Is there a DME ordered?  No   Prep survey completed?  Yes          Emiliana Lloyd RN

## 2019-11-13 ENCOUNTER — READMISSION MANAGEMENT (OUTPATIENT)
Dept: CALL CENTER | Facility: HOSPITAL | Age: 49
End: 2019-11-13

## 2019-11-13 NOTE — OUTREACH NOTE
Stroke Week 1 Survey      Responses   Facility patient discharged from?  [place]   Does the patient have one of the following disease processes/diagnoses(primary or secondary)?  Stroke (TIA)   Is there a successful TCM telephone encounter documented?  No   Week 1 attempt successful?  Yes   Call start time  1049   Call end time  1059   Discharge diagnosis  Cerebrovascular accident (CVA)   Is patient permission given to speak with other caregiver?  No   Meds reviewed with patient/caregiver?  Yes   Is the patient having any side effects they believe may be caused by any medication additions or changes?  No   Does the patient have all medications ordered at discharge?  Yes   Is the patient taking all medications as directed (includes completed medication regime)?  Yes   Does the patient have a primary care provider?   Yes   Does the patient have an appointment with their PCP within 7 days of discharge?  Greater than 7 days   Comments regarding PCP  Office Visit with ANTONELLA Myers, Thursday Nov 21, 2019 2:00 PM   Nursing Interventions  Verified appointment date/time/provider   Has the patient kept scheduled appointments due by today?  N/A   Has home health visited the patient within 72 hours of discharge?  N/A   Psychosocial issues?  No   Does the patient require any assistance with activities of daily living such as eating, bathing, dressing, walking, etc.?  No   Does the patient have any residual symptoms from stroke/TIA?  Yes   Residual symptoms comments  Continued right sided numbness and weakness   Does the patient understand the diet ordered at discharge?  Yes   Did the patient receive a copy of their discharge instructions?  Yes   Nursing interventions  Reviewed instructions with patient   What is the patient's perception of their health status since discharge?  Same   Nursing interventions  Nurse provided patient education   Is the patient able to teach back FAST for Stroke?  Yes   Is the  patient/caregiver able to teach back the risk factors for a stroke?  Diabetes, High blood pressure-goal below 120/80, High Cholesterol   Is the patient/caregiver able to teach back signs and symptoms related to disease process for when to call PCP?  Yes   Is the patient/caregiver able to teach back signs and symptoms related to disease process for when to call 911?  Yes   Is the patient/caregiver able to teach back the hierarchy of who to call/visit for symptoms/problems? PCP, Specialist, Home health nurse, Urgent Care, ED, 911  Yes   Week 1 call completed?  Yes          Merry Avery RN

## 2019-11-20 ENCOUNTER — READMISSION MANAGEMENT (OUTPATIENT)
Dept: CALL CENTER | Facility: HOSPITAL | Age: 49
End: 2019-11-20

## 2019-11-20 NOTE — OUTREACH NOTE
Stroke Week 2 Survey      Responses   Facility patient discharged from?  [place]   Does the patient have one of the following disease processes/diagnoses(primary or secondary)?  Stroke (TIA)   Week 2 attempt successful?  Yes   Call start time  1537   Call end time  1547   Discharge diagnosis  Cerebrovascular accident (CVA)   Meds reviewed with patient/caregiver?  Yes   Is the patient taking all medications as directed (includes completed medication regime)?  Yes   Has the patient kept scheduled appointments due by today?  Yes   Psychosocial issues?  No   Does the patient require any assistance with activities of daily living such as eating, bathing, dressing, walking, etc.?  No   Does the patient have any residual symptoms from stroke/TIA?  Yes   Residual symptoms comments  Right sided numbness and tingling are still present. Not better, not worse.    Does the patient understand the diet ordered at discharge?  Yes   What is the patient's perception of their health status since discharge?  Improving   Nursing interventions  Nurse provided patient education   Is the patient able to teach back FAST for Stroke?  Yes   Is the patient/caregiver able to teach back the risk factors for a stroke?  High blood pressure-goal below 120/80, Smoking, Diabetes, High Cholesterol, Physical inactivity and obesity   If the patient is a current smoker, are they able to teach back resources for cessation?  Smoking cessation medications [Pt is a smoker]   Week 2 call completed?  Yes   Wrap up additional comments  Pt is doing a lot of label reading for foods. He's reading and educating himself.           Luana Landry RN

## 2019-11-21 ENCOUNTER — OFFICE VISIT (OUTPATIENT)
Dept: FAMILY MEDICINE CLINIC | Facility: CLINIC | Age: 49
End: 2019-11-21

## 2019-11-21 VITALS
HEIGHT: 68 IN | WEIGHT: 198 LBS | HEART RATE: 110 BPM | SYSTOLIC BLOOD PRESSURE: 128 MMHG | TEMPERATURE: 98.5 F | BODY MASS INDEX: 30.01 KG/M2 | OXYGEN SATURATION: 97 % | DIASTOLIC BLOOD PRESSURE: 70 MMHG

## 2019-11-21 DIAGNOSIS — E78.5 HYPERLIPIDEMIA, UNSPECIFIED HYPERLIPIDEMIA TYPE: ICD-10-CM

## 2019-11-21 DIAGNOSIS — I10 ESSENTIAL HYPERTENSION: ICD-10-CM

## 2019-11-21 DIAGNOSIS — E11.65 TYPE 2 DIABETES MELLITUS WITH HYPERGLYCEMIA, WITHOUT LONG-TERM CURRENT USE OF INSULIN (HCC): ICD-10-CM

## 2019-11-21 DIAGNOSIS — R79.89 ABNORMAL CBC: ICD-10-CM

## 2019-11-21 DIAGNOSIS — R01.1 MURMUR: ICD-10-CM

## 2019-11-21 DIAGNOSIS — R20.0 RIGHT SIDED NUMBNESS: ICD-10-CM

## 2019-11-21 DIAGNOSIS — I63.81 CEREBROVASCULAR ACCIDENT (CVA) DUE TO OCCLUSION OF SMALL ARTERY (HCC): Primary | ICD-10-CM

## 2019-11-21 PROCEDURE — 99214 OFFICE O/P EST MOD 30 MIN: CPT | Performed by: NURSE PRACTITIONER

## 2019-11-21 RX ORDER — ATORVASTATIN CALCIUM 80 MG/1
80 TABLET, FILM COATED ORAL NIGHTLY
Qty: 90 TABLET | Refills: 1 | Status: SHIPPED | OUTPATIENT
Start: 2019-11-21 | End: 2020-03-02 | Stop reason: SDUPTHER

## 2019-11-21 RX ORDER — LISINOPRIL 20 MG/1
20 TABLET ORAL DAILY
Qty: 90 TABLET | Refills: 1 | Status: SHIPPED | OUTPATIENT
Start: 2019-11-21 | End: 2020-03-02 | Stop reason: SDUPTHER

## 2019-11-21 RX ORDER — AMLODIPINE BESYLATE 5 MG/1
5 TABLET ORAL
Qty: 90 TABLET | Refills: 1 | Status: SHIPPED | OUTPATIENT
Start: 2019-11-21 | End: 2020-03-02 | Stop reason: SDUPTHER

## 2019-11-21 RX ORDER — GLIPIZIDE 5 MG/1
5 TABLET ORAL
Qty: 90 TABLET | Refills: 1 | Status: SHIPPED | OUTPATIENT
Start: 2019-11-21 | End: 2020-02-26

## 2019-11-21 NOTE — PROGRESS NOTES
Subjective   Roni Villaseñor is a 49 y.o. male.     History of Present Illness   Here today to f/u from hospital visit, he was seen in the office as a new patient on 11/7/19, sent to [facility] ER for right sided numbness, please refer to H&P and d/c katerina for details, he was admitted to [facility] for stroke work up, MRI brain shows subacute left thalamic lacunar stroke . He was diagnosed with diabetes, d/c home on metformin 500mg bid and glipizide 5mg daily, d/c home on baby asa 81mg daily, lipitor 80mg daily, he was given lisinopril 20mg and also added amlodipine 5mg daily, BP well controlled today, saw neurology inpatient, recommended outpatient f/u as needed BP <130/80, Bp and DM control. a1c in hospital 11/8/19 10.4. Echo WNL. CTA head and neck no occlusion. He still has numbness on right side, arm, face, leg, denies worsening symptoms, denies weakness, states use of right hand improved some. Types for work. States walking is ok, walking on treadmill. He does have accucheck at home, BS usually 110s-130s. Hx of murmur. Does not currently see.   Elevated wbc while in hospital. He is trying to quit smoking.             The following portions of the patient's history were reviewed and updated as appropriate: allergies, current medications, past family history, past medical history, past social history, past surgical history and problem list.    Review of Systems   Constitutional: Negative for chills, diaphoresis and fever.   Respiratory: Negative for cough and shortness of breath.    Cardiovascular: Negative for chest pain, palpitations and leg swelling.   Musculoskeletal: Negative for arthralgias and myalgias.   Neurological: Positive for numbness. Negative for dizziness, tremors, seizures, syncope, facial asymmetry, speech difficulty, weakness, light-headedness, headache, memory problem and confusion.   All other systems reviewed and are negative.      Objective   Physical Exam   Constitutional: He is oriented to person,  place, and time. He appears well-developed and well-nourished.   HENT:   Head: Normocephalic.   Eyes: Pupils are equal, round, and reactive to light.   Neck: Normal range of motion.   Cardiovascular: Normal rate, regular rhythm, normal heart sounds and intact distal pulses.   Pulses:       Radial pulses are 2+ on the right side, and 2+ on the left side.        Dorsalis pedis pulses are 2+ on the right side, and 2+ on the left side.        Posterior tibial pulses are 2+ on the right side, and 2+ on the left side.   Pulmonary/Chest: Effort normal and breath sounds normal.   Musculoskeletal: Normal range of motion.   Lymphadenopathy:     He has no cervical adenopathy.   Neurological: He is alert and oriented to person, place, and time. He has normal strength. No cranial nerve deficit or sensory deficit. He displays a negative Romberg sign.   Skin: Skin is warm and dry.   Psychiatric: He has a normal mood and affect. His behavior is normal.   Nursing note and vitals reviewed.        Assessment/Plan   Roni was seen today for follow-up.    Diagnoses and all orders for this visit:    Cerebrovascular accident (CVA) due to occlusion of small artery (CMS/HCC)  -     Ambulatory Referral to Neurology    Hyperlipidemia, unspecified hyperlipidemia type    Essential hypertension    Type 2 diabetes mellitus with hyperglycemia, without long-term current use of insulin (CMS/HCC)    Right sided numbness    Abnormal CBC  -     CBC & Differential; Future    Murmur    Other orders  -     lisinopril (PRINIVIL,ZESTRIL) 20 MG tablet; Take 1 tablet by mouth Daily.  -     atorvastatin (LIPITOR) 80 MG tablet; Take 1 tablet by mouth Every Night.  -     metFORMIN (GLUCOPHAGE) 500 MG tablet; Take 1 tablet by mouth 2 (Two) Times a Day With Meals. Start taking on 11/11  -     glipizide (GLUCOTROL) 5 MG tablet; Take 1 tablet by mouth Every Morning Before Breakfast.  -     amLODIPine (NORVASC) 5 MG tablet; Take 1 tablet by mouth Daily.        Diabetes  education given, handouts given, educated about low carb and sugar diet, exercise 30 min 3-5 days per week, handout for nutritionist, seminars given, eye doctor handout given he will call for appt.   Deferred cardiology appt todyafor murmur.   Recheck in office 3 months for fasting labs.   Cont current medications,   Refer to neurology will call with appt.   Educated about s/s stroke, call 911 with any worsening symptoms  Cont to monitor BP and BS at home call with problems  Increase fluid intake, get plenty of rest.   Patient agrees with plan of care and understands instructions. Call if worsening symptoms or any problems or concerns.     Current outpatient and discharge medications have been reconciled for the patient.  Reviewed by: ANTONELLA Myers

## 2019-11-21 NOTE — PATIENT INSTRUCTIONS
"DASH Eating Plan  DASH stands for \"Dietary Approaches to Stop Hypertension.\" The DASH eating plan is a healthy eating plan that has been shown to reduce high blood pressure (hypertension). It may also reduce your risk for type 2 diabetes, heart disease, and stroke. The DASH eating plan may also help with weight loss.  What are tips for following this plan?    General guidelines  · Avoid eating more than 2,300 mg (milligrams) of salt (sodium) a day. If you have hypertension, you may need to reduce your sodium intake to 1,500 mg a day.  · Limit alcohol intake to no more than 1 drink a day for nonpregnant women and 2 drinks a day for men. One drink equals 12 oz of beer, 5 oz of wine, or 1½ oz of hard liquor.  · Work with your health care provider to maintain a healthy body weight or to lose weight. Ask what an ideal weight is for you.  · Get at least 30 minutes of exercise that causes your heart to beat faster (aerobic exercise) most days of the week. Activities may include walking, swimming, or biking.  · Work with your health care provider or diet and nutrition specialist (dietitian) to adjust your eating plan to your individual calorie needs.  Reading food labels    · Check food labels for the amount of sodium per serving. Choose foods with less than 5 percent of the Daily Value of sodium. Generally, foods with less than 300 mg of sodium per serving fit into this eating plan.  · To find whole grains, look for the word \"whole\" as the first word in the ingredient list.  Shopping  · Buy products labeled as \"low-sodium\" or \"no salt added.\"  · Buy fresh foods. Avoid canned foods and premade or frozen meals.  Cooking  · Avoid adding salt when cooking. Use salt-free seasonings or herbs instead of table salt or sea salt. Check with your health care provider or pharmacist before using salt substitutes.  · Do not tejeda foods. Cook foods using healthy methods such as baking, boiling, grilling, and broiling instead.  · Cook with " heart-healthy oils, such as olive, canola, soybean, or sunflower oil.  Meal planning  · Eat a balanced diet that includes:  ? 5 or more servings of fruits and vegetables each day. At each meal, try to fill half of your plate with fruits and vegetables.  ? Up to 6-8 servings of whole grains each day.  ? Less than 6 oz of lean meat, poultry, or fish each day. A 3-oz serving of meat is about the same size as a deck of cards. One egg equals 1 oz.  ? 2 servings of low-fat dairy each day.  ? A serving of nuts, seeds, or beans 5 times each week.  ? Heart-healthy fats. Healthy fats called Omega-3 fatty acids are found in foods such as flaxseeds and coldwater fish, like sardines, salmon, and mackerel.  · Limit how much you eat of the following:  ? Canned or prepackaged foods.  ? Food that is high in trans fat, such as fried foods.  ? Food that is high in saturated fat, such as fatty meat.  ? Sweets, desserts, sugary drinks, and other foods with added sugar.  ? Full-fat dairy products.  · Do not salt foods before eating.  · Try to eat at least 2 vegetarian meals each week.  · Eat more home-cooked food and less restaurant, buffet, and fast food.  · When eating at a restaurant, ask that your food be prepared with less salt or no salt, if possible.  What foods are recommended?  The items listed may not be a complete list. Talk with your dietitian about what dietary choices are best for you.  Grains  Whole-grain or whole-wheat bread. Whole-grain or whole-wheat pasta. Brown rice. Oatmeal. Quinoa. Bulgur. Whole-grain and low-sodium cereals. Rosenda bread. Low-fat, low-sodium crackers. Whole-wheat flour tortillas.  Vegetables  Fresh or frozen vegetables (raw, steamed, roasted, or grilled). Low-sodium or reduced-sodium tomato and vegetable juice. Low-sodium or reduced-sodium tomato sauce and tomato paste. Low-sodium or reduced-sodium canned vegetables.  Fruits  All fresh, dried, or frozen fruit. Canned fruit in natural juice (without  added sugar).  Meat and other protein foods  Skinless chicken or turkey. Ground chicken or turkey. Pork with fat trimmed off. Fish and seafood. Egg whites. Dried beans, peas, or lentils. Unsalted nuts, nut butters, and seeds. Unsalted canned beans. Lean cuts of beef with fat trimmed off. Low-sodium, lean deli meat.  Dairy  Low-fat (1%) or fat-free (skim) milk. Fat-free, low-fat, or reduced-fat cheeses. Nonfat, low-sodium ricotta or cottage cheese. Low-fat or nonfat yogurt. Low-fat, low-sodium cheese.  Fats and oils  Soft margarine without trans fats. Vegetable oil. Low-fat, reduced-fat, or light mayonnaise and salad dressings (reduced-sodium). Canola, safflower, olive, soybean, and sunflower oils. Avocado.  Seasoning and other foods  Herbs. Spices. Seasoning mixes without salt. Unsalted popcorn and pretzels. Fat-free sweets.  What foods are not recommended?  The items listed may not be a complete list. Talk with your dietitian about what dietary choices are best for you.  Grains  Baked goods made with fat, such as croissants, muffins, or some breads. Dry pasta or rice meal packs.  Vegetables  Creamed or fried vegetables. Vegetables in a cheese sauce. Regular canned vegetables (not low-sodium or reduced-sodium). Regular canned tomato sauce and paste (not low-sodium or reduced-sodium). Regular tomato and vegetable juice (not low-sodium or reduced-sodium). Pickles. Olives.  Fruits  Canned fruit in a light or heavy syrup. Fried fruit. Fruit in cream or butter sauce.  Meat and other protein foods  Fatty cuts of meat. Ribs. Fried meat. Rosas. Sausage. Bologna and other processed lunch meats. Salami. Fatback. Hotdogs. Bratwurst. Salted nuts and seeds. Canned beans with added salt. Canned or smoked fish. Whole eggs or egg yolks. Chicken or turkey with skin.  Dairy  Whole or 2% milk, cream, and half-and-half. Whole or full-fat cream cheese. Whole-fat or sweetened yogurt. Full-fat cheese. Nondairy creamers. Whipped toppings.  Processed cheese and cheese spreads.  Fats and oils  Butter. Stick margarine. Lard. Shortening. Ghee. Rosas fat. Tropical oils, such as coconut, palm kernel, or palm oil.  Seasoning and other foods  Salted popcorn and pretzels. Onion salt, garlic salt, seasoned salt, table salt, and sea salt. Worcestershire sauce. Tartar sauce. Barbecue sauce. Teriyaki sauce. Soy sauce, including reduced-sodium. Steak sauce. Canned and packaged gravies. Fish sauce. Oyster sauce. Cocktail sauce. Horseradish that you find on the shelf. Ketchup. Mustard. Meat flavorings and tenderizers. Bouillon cubes. Hot sauce and Tabasco sauce. Premade or packaged marinades. Premade or packaged taco seasonings. Relishes. Regular salad dressings.  Where to find more information:  · National Heart, Lung, and Blood [place]: www.nhlbi.nih.gov  · American Heart Association: www.heart.org  Summary  · The DASH eating plan is a healthy eating plan that has been shown to reduce high blood pressure (hypertension). It may also reduce your risk for type 2 diabetes, heart disease, and stroke.  · With the DASH eating plan, you should limit salt (sodium) intake to 2,300 mg a day. If you have hypertension, you may need to reduce your sodium intake to 1,500 mg a day.  · When on the DASH eating plan, aim to eat more fresh fruits and vegetables, whole grains, lean proteins, low-fat dairy, and heart-healthy fats.  · Work with your health care provider or diet and nutrition specialist (dietitian) to adjust your eating plan to your individual calorie needs.  This information is not intended to replace advice given to you by your health care provider. Make sure you discuss any questions you have with your health care provider.  Document Released: 12/06/2012 Document Revised: 12/11/2017 Document Reviewed: 12/11/2017  [employer] Interactive Patient Education © 2019 [employer]  Diabetes Mellitus and Nutrition, Adult  When you have diabetes (diabetes mellitus), it is very  important to have healthy eating habits because your blood sugar (glucose) levels are greatly affected by what you eat and drink. Eating healthy foods in the appropriate amounts, at about the same times every day, can help you:  · Control your blood glucose.  · Lower your risk of heart disease.  · Improve your blood pressure.  · Reach or maintain a healthy weight.  Every person with diabetes is different, and each person has different needs for a meal plan. Your health care provider may recommend that you work with a diet and nutrition specialist (dietitian) to make a meal plan that is best for you. Your meal plan may vary depending on factors such as:  · The calories you need.  · The medicines you take.  · Your weight.  · Your blood glucose, blood pressure, and cholesterol levels.  · Your activity level.  · Other health conditions you have, such as heart or kidney disease.  How do carbohydrates affect me?  Carbohydrates, also called carbs, affect your blood glucose level more than any other type of food. Eating carbs naturally raises the amount of glucose in your blood. Carb counting is a method for keeping track of how many carbs you eat. Counting carbs is important to keep your blood glucose at a healthy level, especially if you use insulin or take certain oral diabetes medicines.  It is important to know how many carbs you can safely have in each meal. This is different for every person. Your dietitian can help you calculate how many carbs you should have at each meal and for each snack.  Foods that contain carbs include:  · Bread, cereal, rice, pasta, and crackers.  · Potatoes and corn.  · Peas, beans, and lentils.  · Milk and yogurt.  · Fruit and juice.  · Desserts, such as cakes, cookies, ice cream, and candy.  How does alcohol affect me?  Alcohol can cause a sudden decrease in blood glucose (hypoglycemia), especially if you use insulin or take certain oral diabetes medicines. Hypoglycemia can be a  "life-threatening condition. Symptoms of hypoglycemia (sleepiness, dizziness, and confusion) are similar to symptoms of having too much alcohol.  If your health care provider says that alcohol is safe for you, follow these guidelines:  · Limit alcohol intake to no more than 1 drink per day for nonpregnant women and 2 drinks per day for men. One drink equals 12 oz of beer, 5 oz of wine, or 1½ oz of hard liquor.  · Do not drink on an empty stomach.  · Keep yourself hydrated with water, diet soda, or unsweetened iced tea.  · Keep in mind that regular soda, juice, and other mixers may contain a lot of sugar and must be counted as carbs.  What are tips for following this plan?    Reading food labels  · Start by checking the serving size on the \"Nutrition Facts\" label of packaged foods and drinks. The amount of calories, carbs, fats, and other nutrients listed on the label is based on one serving of the item. Many items contain more than one serving per package.  · Check the total grams (g) of carbs in one serving. You can calculate the number of servings of carbs in one serving by dividing the total carbs by 15. For example, if a food has 30 g of total carbs, it would be equal to 2 servings of carbs.  · Check the number of grams (g) of saturated and trans fats in one serving. Choose foods that have low or no amount of these fats.  · Check the number of milligrams (mg) of salt (sodium) in one serving. Most people should limit total sodium intake to less than 2,300 mg per day.  · Always check the nutrition information of foods labeled as \"low-fat\" or \"nonfat\". These foods may be higher in added sugar or refined carbs and should be avoided.  · Talk to your dietitian to identify your daily goals for nutrients listed on the label.  Shopping  · Avoid buying canned, premade, or processed foods. These foods tend to be high in fat, sodium, and added sugar.  · Shop around the outside edge of the grocery store. This includes fresh " fruits and vegetables, bulk grains, fresh meats, and fresh dairy.  Cooking  · Use low-heat cooking methods, such as baking, instead of high-heat cooking methods like deep frying.  · Cook using healthy oils, such as olive, canola, or sunflower oil.  · Avoid cooking with butter, cream, or high-fat meats.  Meal planning  · Eat meals and snacks regularly, preferably at the same times every day. Avoid going long periods of time without eating.  · Eat foods high in fiber, such as fresh fruits, vegetables, beans, and whole grains. Talk to your dietitian about how many servings of carbs you can eat at each meal.  · Eat 4-6 ounces (oz) of lean protein each day, such as lean meat, chicken, fish, eggs, or tofu. One oz of lean protein is equal to:  ? 1 oz of meat, chicken, or fish.  ? 1 egg.  ? ¼ cup of tofu.  · Eat some foods each day that contain healthy fats, such as avocado, nuts, seeds, and fish.  Lifestyle  · Check your blood glucose regularly.  · Exercise regularly as told by your health care provider. This may include:  ? 150 minutes of moderate-intensity or vigorous-intensity exercise each week. This could be brisk walking, biking, or water aerobics.  ? Stretching and doing strength exercises, such as yoga or weightlifting, at least 2 times a week.  · Take medicines as told by your health care provider.  · Do not use any products that contain nicotine or tobacco, such as cigarettes and e-cigarettes. If you need help quitting, ask your health care provider.  · Work with a counselor or diabetes educator to identify strategies to manage stress and any emotional and social challenges.  Questions to ask a health care provider  · Do I need to meet with a diabetes educator?  · Do I need to meet with a dietitian?  · What number can I call if I have questions?  · When are the best times to check my blood glucose?  Where to find more information:  · American Diabetes Association: diabetes.org  · Academy of Nutrition and  Dietetics: www.eatright.org  · National [place] of Diabetes and Digestive and Kidney Diseases (NIH): www.niddk.nih.gov  Summary  · A healthy meal plan will help you control your blood glucose and maintain a healthy lifestyle.  · Working with a diet and nutrition specialist (dietitian) can help you make a meal plan that is best for you.  · Keep in mind that carbohydrates (carbs) and alcohol have immediate effects on your blood glucose levels. It is important to count carbs and to use alcohol carefully.  This information is not intended to replace advice given to you by your health care provider. Make sure you discuss any questions you have with your health care provider.  Document Released: 09/14/2006 Document Revised: 07/18/2018 Document Reviewed: 01/22/2018  [employer] Interactive Patient Education © 2019 [employer]  Stroke Prevention  Some medical conditions and behaviors are associated with a higher chance of having a stroke. You can help prevent a stroke by making nutrition, lifestyle, and other changes, including managing any medical conditions you may have.  What nutrition changes can be made?    · Eat healthy foods. You can do this by:  ? Choosing foods high in fiber, such as fresh fruits and vegetables and whole grains.  ? Eating at least 5 or more servings of fruits and vegetables a day. Try to fill half of your plate at each meal with fruits and vegetables.  ? Choosing lean protein foods, such as lean cuts of meat, poultry without skin, fish, tofu, beans, and nuts.  ? Eating low-fat dairy products.  ? Avoiding foods that are high in salt (sodium). This can help lower blood pressure.  ? Avoiding foods that have saturated fat, trans fat, and cholesterol. This can help prevent high cholesterol.  ? Avoiding processed and premade foods.  · Follow your health care provider's specific guidelines for losing weight, controlling high blood pressure (hypertension), lowering high cholesterol, and managing diabetes.  These may include:  ? Reducing your daily calorie intake.  ? Limiting your daily sodium intake to 1,500 milligrams (mg).  ? Using only healthy fats for cooking, such as olive oil, canola oil, or sunflower oil.  ? Counting your daily carbohydrate intake.  What lifestyle changes can be made?  · Maintain a healthy weight. Talk to your health care provider about your ideal weight.  · Get at least 30 minutes of moderate physical activity at least 5 days a week. Moderate activity includes brisk walking, biking, and swimming.  · Do not use any products that contain nicotine or tobacco, such as cigarettes and e-cigarettes. If you need help quitting, ask your health care provider. It may also be helpful to avoid exposure to secondhand smoke.  · Limit alcohol intake to no more than 1 drink a day for nonpregnant women and 2 drinks a day for men. One drink equals 12 oz of beer, 5 oz of wine, or 1½ oz of hard liquor.  · Stop any illegal drug use.  · Avoid taking birth control pills. Talk to your health care provider about the risks of taking birth control pills if:  ? You are over 35 years old.  ? You smoke.  ? You get migraines.  ? You have ever had a blood clot.  What other changes can be made?  · Manage your cholesterol levels.  ? Eating a healthy diet is important for preventing high cholesterol. If cholesterol cannot be managed through diet alone, you may also need to take medicines.  ? Take any prescribed medicines to control your cholesterol as told by your health care provider.  · Manage your diabetes.  ? Eating a healthy diet and exercising regularly are important parts of managing your blood sugar. If your blood sugar cannot be managed through diet and exercise, you may need to take medicines.  ? Take any prescribed medicines to control your diabetes as told by your health care provider.  · Control your hypertension.  ? To reduce your risk of stroke, try to keep your blood pressure below 130/80.  ? Eating a healthy  diet and exercising regularly are an important part of controlling your blood pressure. If your blood pressure cannot be managed through diet and exercise, you may need to take medicines.  ? Take any prescribed medicines to control hypertension as told by your health care provider.  ? Ask your health care provider if you should monitor your blood pressure at home.  ? Have your blood pressure checked every year, even if your blood pressure is normal. Blood pressure increases with age and some medical conditions.  · Get evaluated for sleep disorders (sleep apnea). Talk to your health care provider about getting a sleep evaluation if you snore a lot or have excessive sleepiness.  · Take over-the-counter and prescription medicines only as told by your health care provider. Aspirin or blood thinners (antiplatelets or anticoagulants) may be recommended to reduce your risk of forming blood clots that can lead to stroke.  · Make sure that any other medical conditions you have, such as atrial fibrillation or atherosclerosis, are managed.  What are the warning signs of a stroke?  The warning signs of a stroke can be easily remembered as BEFAST.  · B is for balance. Signs include:  ? Dizziness.  ? Loss of balance or coordination.  ? Sudden trouble walking.  · E is for eyes. Signs include:  ? A sudden change in vision.  ? Trouble seeing.  · F is for face. Signs include:  ? Sudden weakness or numbness of the face.  ? The face or eyelid drooping to one side.  · A is for arms. Signs include:  ? Sudden weakness or numbness of the arm, usually on one side of the body.  · S is for speech. Signs include:  ? Trouble speaking (aphasia).  ? Trouble understanding.  · T is for time.  ? These symptoms may represent a serious problem that is an emergency. Do not wait to see if the symptoms will go away. Get medical help right away. Call your local emergency services (911 in the [place] Do not drive yourself to the hospital.  · Other signs of  stroke may include:  ? A sudden, severe headache with no known cause.  ? Nausea or vomiting.  ? Seizure.  Where to find more information  For more information, visit:  · American Stroke Association: www.strokeassociation.org  · National Stroke Association: www.stroke.org  Summary  · You can prevent a stroke by eating healthy, exercising, not smoking, limiting alcohol intake, and managing any medical conditions you may have.  · Do not use any products that contain nicotine or tobacco, such as cigarettes and e-cigarettes. If you need help quitting, ask your health care provider. It may also be helpful to avoid exposure to secondhand smoke.  · Remember BEFAST for warning signs of stroke. Get help right away if you or a loved one has any of these signs.  This information is not intended to replace advice given to you by your health care provider. Make sure you discuss any questions you have with your health care provider.  Document Released: 01/25/2006 Document Revised: 01/23/2018 Document Reviewed: 01/23/2018  [employer] Interactive Patient Education © 2019 [employer]      Diabetes education given, handouts given, educated about low carb and sugar diet, exercise 30 min 3-5 days per week, handout for nutritionist, seminars given, eye doctor handout given he will call for appt.   Deferred cardiology appt todyafor murmur.   Recheck in office 3 months for fasting labs.   Cont current medications,   Refer to neurology will call with appt.   Educated about s/s stroke, call 911 with any worsening symptoms  Cont to monitor BP and BS at home call with problems  Increase fluid intake, get plenty of rest.   Patient agrees with plan of care and understands instructions. Call if worsening symptoms or any problems or concerns.

## 2019-11-22 ENCOUNTER — TELEPHONE (OUTPATIENT)
Dept: NEUROLOGY | Facility: CLINIC | Age: 49
End: 2019-11-22

## 2019-11-22 NOTE — TELEPHONE ENCOUNTER
Two Week Stroke Phone Call  Spoke with the patient  · Admission Date: 11/7/2019  · Discharge Date:  11/8/2019  · Discharge Destination: Home  · Meds reviewed with patient/caregiver?    [x]Yes [] No   o Antiplatelet: ASA  - Understands purpose     [x]  Yes     []  No     - Understands how to take      [x]  Yes     []  No    o Cholesterol Reducing: Lipitor  - Understands purpose     [x]  Yes     []  No    - Understands how to take      [x]  Yes     []  No   · Is the patient taking all medication as directed?   [x]  Yes  []  No  · Discussed personal risk factors   [x]  Yes []  No    o Physical Inactivity  - Engaged in physical activity [x]  Yes []  No   o Obesity or overweight  - Been on a calorie restricted diet? [x]  Yes []  No   - Pt states that he's reading labels to cut carbs, salt and sugar out of his diet.  o Smoking or other tobacco use  - Has attempted to quit smoking []  Yes     [x]  No   o High blood pressure   - Reviewed medications ordered for high blood pressure  - Has been monitoring BP [x]  Yes     []  No  - Bp goal <130/80  - Most recent /70  o Diabetes   - Reviewed medications ordered for diabetes  - Checking BG levels regularly.  States that his BG ranges from 100-130 in the am.   o High cholesterol   - Review desired LDL goal <70  o Atherosclerosis  - Plaque inside the arteries, “hardening of the arteries”  • Discussed signs and symptoms of stroke and when patient to call 911?   [x]  Yes []  No  o Sudden weakness or numbness of the face, arm, or leg especially on one side of the body  o Sudden confusion, trouble speaking or understanding  o Sudden trouble seeing in one or both eyes   o Sudden trouble walking, dizziness, loss of balance or coordination  o Sudden severe headaches with no known cause    Notified Patient that if any of these symptoms occur to call 911  · Does the patient have any new signs or symptoms of a stroke?   []  Yes     [x]  No   Still has some tingling in hand but  not  · Does the patient have an appointment with PCP?  [x]  Yes     []  No  · Does the patient have 3 month Stroke Clinic appointment?  [x]  Yes     []  No  · Is the patient currently in therapy, outpatient, or home health?  []  Yes     [x]  No    Needs a referral?      []  Yes     [x]  No   Does the patient have increasing stiffness in your arms, hands, or legs?    []  Yes     [x]  No   Is this interfering with activities of daily living?   []  Yes     [x]  No  Patient Satisfaction   · How would you rate your satisfaction with the instructions provided about your specific risk factors for stroke?   []Poor  [] Fair    [] Good [x] Very Good  [] Excellent   · How would you rate your satisfaction with the instructions provided on the warnings signs and symptoms of stroke?   []Poor  [] Fair   [] Good [x] Very Good  [] Excellent   · How well did we explain the importance of calling 911 to activate the emergency medical system for new signs and symptoms of stroke?    []Poor  [] Fair   [] Good [x] Very Good  [] Excellent   · Would you recommend the stroke center to your friends and family?   []Definitely Would Not  [] Probably Would Not  [] Neutral   []  Probably Would [x] Definitely Would

## 2019-11-27 ENCOUNTER — READMISSION MANAGEMENT (OUTPATIENT)
Dept: CALL CENTER | Facility: HOSPITAL | Age: 49
End: 2019-11-27

## 2019-11-27 NOTE — OUTREACH NOTE
Stroke Week 3 Survey      Responses   Facility patient discharged from?  [facility]   Does the patient have one of the following disease processes/diagnoses(primary or secondary)?  Stroke (TIA)   Week 3 attempt successful?  No   Unsuccessful attempts  Attempt 1          Sabar Springer RN

## 2019-11-29 ENCOUNTER — READMISSION MANAGEMENT (OUTPATIENT)
Dept: CALL CENTER | Facility: HOSPITAL | Age: 49
End: 2019-11-29

## 2019-11-29 NOTE — OUTREACH NOTE
Stroke Week 3 Survey      Responses   Facility patient discharged from?  [place]   Does the patient have one of the following disease processes/diagnoses(primary or secondary)?  Stroke (TIA)   Week 3 attempt successful?  No   Unsuccessful attempts  Attempt 2          Luana Landry RN

## 2020-01-14 ENCOUNTER — OFFICE VISIT (OUTPATIENT)
Dept: NEUROLOGY | Facility: CLINIC | Age: 50
End: 2020-01-14

## 2020-01-14 VITALS
OXYGEN SATURATION: 98 % | SYSTOLIC BLOOD PRESSURE: 128 MMHG | BODY MASS INDEX: 28.19 KG/M2 | WEIGHT: 186 LBS | HEART RATE: 96 BPM | DIASTOLIC BLOOD PRESSURE: 82 MMHG | HEIGHT: 68 IN

## 2020-01-14 DIAGNOSIS — I10 ESSENTIAL HYPERTENSION: ICD-10-CM

## 2020-01-14 DIAGNOSIS — Z78.9 ELECTRONIC CIGARETTE USE: ICD-10-CM

## 2020-01-14 DIAGNOSIS — E11.65 TYPE 2 DIABETES MELLITUS WITH HYPERGLYCEMIA, WITHOUT LONG-TERM CURRENT USE OF INSULIN (HCC): ICD-10-CM

## 2020-01-14 DIAGNOSIS — E78.5 HYPERLIPIDEMIA LDL GOAL <70: ICD-10-CM

## 2020-01-14 DIAGNOSIS — I63.81 CEREBROVASCULAR ACCIDENT (CVA) DUE TO OCCLUSION OF SMALL ARTERY (HCC): Primary | ICD-10-CM

## 2020-01-14 PROCEDURE — 99215 OFFICE O/P EST HI 40 MIN: CPT | Performed by: NURSE PRACTITIONER

## 2020-01-14 NOTE — PROGRESS NOTES
"DOS: 2020  NAME: Roni Villaseñor   : 1970  PCP: Lanie Resendez APRN    Chief Complaint   Patient presents with   • Cerebrovascular Accident      SUBJECTIVE  Neurological Problem:  49 y.o. RH  male with HTN, DM, and smoking who presents today for stroke f/u. He is unaccompanied. Patient and problem are new to examiner. History is provided by patient and review of records that are summarized below.     Interval History:   Mr. Villaseñor presented to [facility] in early November after being referred by his PCP for new right sided numbness and weakness and 3 weeks of right hand clumsiness.  Vitals on admission showed a /108, EKG with NSR, serum glucose 225.  Review of his imaging shows a small subacute left thalamic lacunar infarct.  Vessel imaging showed a mild focal stenosis of the left M1, otherwise no flow-limiting stenosis or lesions. TTE with LVEF 56=60%, mild LVH, saline tests negative. He had been taking ASA PTA somewhat intermittently int the past, but not recently.  He was discharged on ASA 81 mg and Lipitor 80 mg.    He presents today continues on ASA 81 mg and Lipitor 80 mg. He c/o some constipation, likely d/t lipitor and is using OTC metamucil with improvement. He continues with right sided sensory deficits, including bottom of right foot (feeling like his sock is \"wadded up\") and dexterity issues (although these have improved to a minor degree).  He denies any new stroke/TIA symptoms.  He is doing a much better job of controlling his risk factors. He continues to participate in all his activities, including work.  Denies any changes in his health since his last visit.  He is scheduled to have f/u labs with his PCP.  He states his BP is well controlled.  He denies any signs/symptoms of sleep apnea, states his fit bit indicates he \"sleeps great \". He denies smoking, but does report vaping but is taking steps to wean himself off this as well. He is \"allergic\" to alcohol.     Review of Systems:Review " of Systems   Constitutional: Negative for activity change, appetite change and fatigue.   HENT: Negative for ear pain, facial swelling and trouble swallowing.    Eyes: Negative for photophobia, pain and visual disturbance.   Respiratory: Negative for cough, chest tightness and shortness of breath.    Cardiovascular: Negative for chest pain, palpitations and leg swelling.   Gastrointestinal: Negative for abdominal pain, nausea and vomiting.   Endocrine: Negative for cold intolerance, heat intolerance and polydipsia.   Musculoskeletal: Negative for back pain, gait problem and neck pain.   Skin: Negative for color change, rash and wound.   Allergic/Immunologic: Negative for environmental allergies, food allergies and immunocompromised state.   Neurological: Positive for numbness (right side). Negative for dizziness, tremors, seizures, syncope, facial asymmetry, speech difficulty, weakness, light-headedness and headaches.   Hematological: Negative for adenopathy. Does not bruise/bleed easily.   Psychiatric/Behavioral: Negative for agitation, behavioral problems, confusion, decreased concentration, dysphoric mood, hallucinations, self-injury, sleep disturbance and suicidal ideas. The patient is not nervous/anxious and is not hyperactive.     Above ROS reviewed    The following portions of the patient's history were reviewed and updated as appropriate: allergies, current medications, past family history, past medical history, past social history, past surgical history and problem list.    Current Medications:   Current Outpatient Medications:   •  amLODIPine (NORVASC) 5 MG tablet, Take 1 tablet by mouth Daily., Disp: 90 tablet, Rfl: 1  •  aspirin 81 MG tablet, Take 1 tablet by mouth Daily., Disp: , Rfl:   •  atorvastatin (LIPITOR) 80 MG tablet, Take 1 tablet by mouth Every Night., Disp: 90 tablet, Rfl: 1  •  glipizide (GLUCOTROL) 5 MG tablet, Take 1 tablet by mouth Every Morning Before Breakfast., Disp: 90 tablet, Rfl:  1  •  lisinopril (PRINIVIL,ZESTRIL) 20 MG tablet, Take 1 tablet by mouth Daily., Disp: 90 tablet, Rfl: 1  •  metFORMIN (GLUCOPHAGE) 500 MG tablet, Take 1 tablet by mouth 2 (Two) Times a Day With Meals. Start taking on 11/11, Disp: 120 tablet, Rfl: 1      OBJECTIVE  Vitals:    01/14/20 1045   BP: 128/82   Pulse: 96   SpO2: 98%     Body mass index is 28.29 kg/m².    Diagnostics:  MRI brain 11/7/19: IMPRESSION:  No evidence of restricted diffusion. An area of T2 FLAIR  hyperintensity is identified involving the thalamus on the left  laterally measuring 8 to 9 mm in size. There is no evidence of  restricted diffusion but restricted diffusion typically persists for  only 10-14 days following an acute infarct. Reportedly the patient has  been symptomatic greater than 10-14 days. This may represent a subacute  infarct. There is mild small vessel ischemic disease, slightly more  prominent for the patient's age. Clinical correlation is recommended.  There is no evidence of mass or of mass effect on this noncontrasted MRI  examination of the brain.  TTE 11/8/19: Interpretation Summary   · Estimated EF appears to be in the range of 56 - 60%  · Left ventricular wall thickness is consistent with mild concentric hypertrophy.  · Left ventricular diastolic function is normal.  · Normal right ventricular cavity size and systolic function noted.  · Saline test results are negative.  · Mild aortic valve regurgitation is present.  · Calculated right ventricular systolic pressure from tricuspid regurgitation is 19 mmHg.  · There is no evidence of pericardial effusion.   CTA head/neck 11/19/19: IMPRESSION:  1. No acute intracranial process identified.  2. No cervical vascular stenosis or occlusion.  3. Mild focal stenosis of the proximal left M1. Otherwise, no  intracranial stenosis or occlusion identified.  EKG 11/7/19: Sinus tach    Laboratory Results:         Lab Results   Component Value Date    WBC 12.28 (H) 11/07/2019    HGB 16.8  11/07/2019    HCT 50.5 11/07/2019    MCV 89.9 11/07/2019     11/07/2019     Lab Results   Component Value Date    GLUCOSE 225 (H) 11/07/2019    BUN 14 11/07/2019    CREATININE 0.99 11/07/2019    EGFRIFNONA 81 11/07/2019    EGFRIFAFRI 98 11/07/2019    BCR 14.1 11/07/2019    K 4.0 11/07/2019    CO2 27.4 11/07/2019    CALCIUM 9.2 11/07/2019     Lab Results   Component Value Date    HGBA1C 10.40 (H) 11/08/2019     Lab Results   Component Value Date    CHOL 166 11/08/2019     Lab Results   Component Value Date    HDL 31 (L) 11/08/2019     Lab Results   Component Value Date     11/08/2019     Lab Results   Component Value Date    TRIG 177 (H) 11/08/2019     No results found for: RPR  Lab Results   Component Value Date    TSH 1.150 11/07/2019     No results found for: [id number]    Physical Examination:   General Appearance:   Well developed, well nourished, well groomed, alert, and cooperative.  HEENT: Normocephalic.    Neck and Spine: Normal range of motion.  Normal alignment. No mass or tenderness. No bruits.  Cardiac: Regular rate and rhythm.   Peripheral Vasculature: Radial pulses are equal and symmetric. No signs of distal embolization.  Extremities:    No edema or deformities. Normal joint ROM.  Skin:    No rashes or birth marks.  Psychiatric:    Normal mood and affect.    Neurological examination:  Higher Integrative  Function: Oriented to time, place and person. Normal registration, recall (3/3 with 1 clue), attention span and concentration. Normal language including comprehension, spontaneous speech, repetition, reading, writing, naming and vocabulary. No neglect with normal visual-spatial function and construction. Normal fund of knowledge and higher integrative function.  CN II: Pupils are equal, round, and reactive to light. Normal visual acuity and visual fields.    CN III IV VI: Extraocular movements are full without nystagmus.   CN V: Decreased sensation on the right  CN VII: Facial movements  are symmetric. No weakness.  CN VIII:   Auditory acuity is normal.  CN IX & X:   Symmetric palatal movement.  CN XI: Sternocleidomastoid and trapezius are normal.  No weakness.  CN XII:   The tongue is midline.  No atrophy or fasciculations.  Motor: Normal muscle strength, bulk and tone in upper and lower extremities except for decreased FFM of right hand.  No fasciculations, rigidity, spasticity, or abnormal movements.  Reflexes: 2+ in the upper and lower extremities.   Sensation: Normal to light touch, pinprick, vibration, temperature, and proprioception in arms and legs except for subjective sensory differences in right hand and bottom of right foot.. Normal graphesthesia and no extinction on DSS.  Station and Gait: Normal gait and station.    Coordination: Finger to nose test shows no dysmetria.  Heel to shin normal.    Impression:  Mr. Villaseñor is doing well following his left thalamic stroke he suffered in November 2019 due to small vessel disease from uncontrolled risk factors.  He does continue with mild right hemisensory deficits and subtle right hand dexterity issues. He has been maintained on ASA and Lipitor 80 mg.  Recommend continuing current medication regimen, he will be following up soon with his PCP for repeat lipid panel, will likely be able to reduce dose for an LDL goal less than 70.  We reviewed his risk factors for stroke with importance of risk factor control for stroke prevention.  We discussed the signs and symptoms of stroke and the importance of calling 911 if were to develop any of these.  He will follow-up here as needed.  He voiced understanding and agrees with above plan.    Plan:     Continue ASA 81 mg and Lipitor 80 mg  Monitor BP regularly, Keep well hydrated  F/U with PCP for continued cholesterol surveillance for goal LDL < 70  Encouraged regular physical activity   Counseled on importance of smoking/vaping cessation  Secondary stroke prevention: Ideal targets for stroke prevention  would be Blood pressure < 130/80; B12 > 500 TSH in normal range and LDL < 70; HbA1c < 6.5 and smoking cessation if applicable.  Call 911 for stroke symptoms  Follow-up prn    I spent 40 minutes face to face with patient, with  > 50% spent counseling patient regarding diagnosis, review of diagnostics, personal risk factors for stroke and importance of risk factor control for stroke prevention, including lifestyle modifications and preventative measures.  Roni was seen today for cerebrovascular accident.    Diagnoses and all orders for this visit:    Cerebrovascular accident (CVA) due to occlusion of small artery (CMS/HCC)    Essential hypertension    Hyperlipidemia LDL goal <70    Type 2 diabetes mellitus with hyperglycemia, without long-term current use of insulin (CMS/HCC)    Electronic cigarette use        Coding      Dictated using Dragon

## 2020-02-21 ENCOUNTER — OFFICE VISIT (OUTPATIENT)
Dept: FAMILY MEDICINE CLINIC | Facility: CLINIC | Age: 50
End: 2020-02-21

## 2020-02-21 VITALS
WEIGHT: 184.4 LBS | HEIGHT: 68 IN | SYSTOLIC BLOOD PRESSURE: 110 MMHG | BODY MASS INDEX: 27.95 KG/M2 | HEART RATE: 88 BPM | OXYGEN SATURATION: 98 % | TEMPERATURE: 98.2 F | DIASTOLIC BLOOD PRESSURE: 66 MMHG

## 2020-02-21 DIAGNOSIS — E78.5 HYPERLIPIDEMIA LDL GOAL <70: ICD-10-CM

## 2020-02-21 DIAGNOSIS — E11.65 TYPE 2 DIABETES MELLITUS WITH HYPERGLYCEMIA, WITHOUT LONG-TERM CURRENT USE OF INSULIN (HCC): ICD-10-CM

## 2020-02-21 DIAGNOSIS — I63.81 CEREBROVASCULAR ACCIDENT (CVA) DUE TO OCCLUSION OF SMALL ARTERY (HCC): Primary | ICD-10-CM

## 2020-02-21 DIAGNOSIS — I10 ESSENTIAL HYPERTENSION: ICD-10-CM

## 2020-02-21 DIAGNOSIS — R01.1 MURMUR: ICD-10-CM

## 2020-02-21 DIAGNOSIS — Z78.9 ELECTRONIC CIGARETTE USE: ICD-10-CM

## 2020-02-21 PROCEDURE — 99214 OFFICE O/P EST MOD 30 MIN: CPT | Performed by: NURSE PRACTITIONER

## 2020-02-21 NOTE — PROGRESS NOTES
Subjective   Roni Villaseñor is a 50 y.o. male.     History of Present Illness   Here today for DM f/u, taking metformin 500mg bid, glipizide 5mg tolerating well, last a1c 11/19 10.40, states BS at home usually 80s. He has lost about 10lbs since last visit, he has been working on diet, states he does exercise, walking 5 miles per day. He is not fasting today.   With HTN, taking amlodipine 5mg daily, lisinopril 20mg daily, he does check BP at home usually 130s/90s, denies CP, SOA, HA, LE edema. Also working on low sodium.   With HLD, taking lipitor 80mg daily, hx of CVA 11/19, saw neurology 1/14/2020 goal ldl <70, also taking asa 81mg daily. He is vaping, working on quitting.     The following portions of the patient's history were reviewed and updated as appropriate: allergies, current medications, past family history, past medical history, past social history, past surgical history and problem list.    Review of Systems   Constitutional: Negative for chills, diaphoresis and fever.   Respiratory: Negative for cough, shortness of breath and wheezing.    Cardiovascular: Negative for chest pain, palpitations and leg swelling.   Musculoskeletal: Negative for arthralgias and myalgias.   Neurological: Negative for dizziness, light-headedness and headache.   All other systems reviewed and are negative.      Objective   Physical Exam   Constitutional: He is oriented to person, place, and time. He appears well-developed and well-nourished.   HENT:   Head: Normocephalic.   Eyes: Pupils are equal, round, and reactive to light.   Neck: Normal range of motion.   Cardiovascular: Normal rate, regular rhythm and intact distal pulses.   Murmur heard.  Pulmonary/Chest: Effort normal and breath sounds normal.   Musculoskeletal: Normal range of motion.   Neurological: He is alert and oriented to person, place, and time.   Skin: Skin is warm and dry.   Psychiatric: He has a normal mood and affect. His behavior is normal.   Nursing note and  vitals reviewed.        Assessment/Plan   Roni was seen today for diabetes, hypertension and hyperlipidemia.    Diagnoses and all orders for this visit:    Cerebrovascular accident (CVA) due to occlusion of small artery (CMS/[facility])  -     Comprehensive Metabolic Panel; Future  -     CBC & Differential; Future  -     Lipid Panel; Future  -     TSH; Future  -     Hemoglobin A1c; Future  -     Ambulatory Referral to Cardiology    Essential hypertension  -     Comprehensive Metabolic Panel; Future  -     CBC & Differential; Future  -     Lipid Panel; Future  -     TSH; Future  -     Hemoglobin A1c; Future    Hyperlipidemia LDL goal <70  -     Comprehensive Metabolic Panel; Future  -     CBC & Differential; Future  -     Lipid Panel; Future  -     TSH; Future  -     Hemoglobin A1c; Future    Type 2 diabetes mellitus with hyperglycemia, without long-term current use of insulin (CMS/[facility])  -     Comprehensive Metabolic Panel; Future  -     CBC & Differential; Future  -     Lipid Panel; Future  -     TSH; Future  -     Hemoglobin A1c; Future  -     MicroAlbumin, Urine, Random - Urine, Clean Catch; Future    Electronic cigarette use  -     Comprehensive Metabolic Panel; Future  -     CBC & Differential; Future  -     Lipid Panel; Future  -     TSH; Future  -     Hemoglobin A1c; Future    Murmur  -     Ambulatory Referral to Cardiology      Cont current meds,   Cont diet and exercise congratulated on weight loss.   Encouraged smoking cessation.  Refer to cardiology will call with appt.   He will return for fasting labs, will call with results.   Pending labs will consider d/c glipizide/ changing metforming to XR if needed.   Encouraged DM eye exam.   Increase fluid intake, get plenty of rest.   Patient agrees with plan of care and understands instructions. Call if worsening symptoms or any problems or concerns.

## 2020-02-21 NOTE — PATIENT INSTRUCTIONS
"DASH Eating Plan  DASH stands for \"Dietary Approaches to Stop Hypertension.\" The DASH eating plan is a healthy eating plan that has been shown to reduce high blood pressure (hypertension). It may also reduce your risk for type 2 diabetes, heart disease, and stroke. The DASH eating plan may also help with weight loss.  What are tips for following this plan?    General guidelines  · Avoid eating more than 2,300 mg (milligrams) of salt (sodium) a day. If you have hypertension, you may need to reduce your sodium intake to 1,500 mg a day.  · Limit alcohol intake to no more than 1 drink a day for nonpregnant women and 2 drinks a day for men. One drink equals 12 oz of beer, 5 oz of wine, or 1½ oz of hard liquor.  · Work with your health care provider to maintain a healthy body weight or to lose weight. Ask what an ideal weight is for you.  · Get at least 30 minutes of exercise that causes your heart to beat faster (aerobic exercise) most days of the week. Activities may include walking, swimming, or biking.  · Work with your health care provider or diet and nutrition specialist (dietitian) to adjust your eating plan to your individual calorie needs.  Reading food labels    · Check food labels for the amount of sodium per serving. Choose foods with less than 5 percent of the Daily Value of sodium. Generally, foods with less than 300 mg of sodium per serving fit into this eating plan.  · To find whole grains, look for the word \"whole\" as the first word in the ingredient list.  Shopping  · Buy products labeled as \"low-sodium\" or \"no salt added.\"  · Buy fresh foods. Avoid canned foods and premade or frozen meals.  Cooking  · Avoid adding salt when cooking. Use salt-free seasonings or herbs instead of table salt or sea salt. Check with your health care provider or pharmacist before using salt substitutes.  · Do not tejeda foods. Cook foods using healthy methods such as baking, boiling, grilling, and broiling instead.  · Cook with " heart-healthy oils, such as olive, canola, soybean, or sunflower oil.  Meal planning  · Eat a balanced diet that includes:  ? 5 or more servings of fruits and vegetables each day. At each meal, try to fill half of your plate with fruits and vegetables.  ? Up to 6-8 servings of whole grains each day.  ? Less than 6 oz of lean meat, poultry, or fish each day. A 3-oz serving of meat is about the same size as a deck of cards. One egg equals 1 oz.  ? 2 servings of low-fat dairy each day.  ? A serving of nuts, seeds, or beans 5 times each week.  ? Heart-healthy fats. Healthy fats called Omega-3 fatty acids are found in foods such as flaxseeds and coldwater fish, like sardines, salmon, and mackerel.  · Limit how much you eat of the following:  ? Canned or prepackaged foods.  ? Food that is high in trans fat, such as fried foods.  ? Food that is high in saturated fat, such as fatty meat.  ? Sweets, desserts, sugary drinks, and other foods with added sugar.  ? Full-fat dairy products.  · Do not salt foods before eating.  · Try to eat at least 2 vegetarian meals each week.  · Eat more home-cooked food and less restaurant, buffet, and fast food.  · When eating at a restaurant, ask that your food be prepared with less salt or no salt, if possible.  What foods are recommended?  The items listed may not be a complete list. Talk with your dietitian about what dietary choices are best for you.  Grains  Whole-grain or whole-wheat bread. Whole-grain or whole-wheat pasta. Brown rice. Oatmeal. Quinoa. Bulgur. Whole-grain and low-sodium cereals. Rosenda bread. Low-fat, low-sodium crackers. Whole-wheat flour tortillas.  Vegetables  Fresh or frozen vegetables (raw, steamed, roasted, or grilled). Low-sodium or reduced-sodium tomato and vegetable juice. Low-sodium or reduced-sodium tomato sauce and tomato paste. Low-sodium or reduced-sodium canned vegetables.  Fruits  All fresh, dried, or frozen fruit. Canned fruit in natural juice (without  added sugar).  Meat and other protein foods  Skinless chicken or turkey. Ground chicken or turkey. Pork with fat trimmed off. Fish and seafood. Egg whites. Dried beans, peas, or lentils. Unsalted nuts, nut butters, and seeds. Unsalted canned beans. Lean cuts of beef with fat trimmed off. Low-sodium, lean deli meat.  Dairy  Low-fat (1%) or fat-free (skim) milk. Fat-free, low-fat, or reduced-fat cheeses. Nonfat, low-sodium ricotta or cottage cheese. Low-fat or nonfat yogurt. Low-fat, low-sodium cheese.  Fats and oils  Soft margarine without trans fats. Vegetable oil. Low-fat, reduced-fat, or light mayonnaise and salad dressings (reduced-sodium). Canola, safflower, olive, soybean, and sunflower oils. Avocado.  Seasoning and other foods  Herbs. Spices. Seasoning mixes without salt. Unsalted popcorn and pretzels. Fat-free sweets.  What foods are not recommended?  The items listed may not be a complete list. Talk with your dietitian about what dietary choices are best for you.  Grains  Baked goods made with fat, such as croissants, muffins, or some breads. Dry pasta or rice meal packs.  Vegetables  Creamed or fried vegetables. Vegetables in a cheese sauce. Regular canned vegetables (not low-sodium or reduced-sodium). Regular canned tomato sauce and paste (not low-sodium or reduced-sodium). Regular tomato and vegetable juice (not low-sodium or reduced-sodium). Pickles. Olives.  Fruits  Canned fruit in a light or heavy syrup. Fried fruit. Fruit in cream or butter sauce.  Meat and other protein foods  Fatty cuts of meat. Ribs. Fried meat. Rosas. Sausage. Bologna and other processed lunch meats. Salami. Fatback. Hotdogs. Bratwurst. Salted nuts and seeds. Canned beans with added salt. Canned or smoked fish. Whole eggs or egg yolks. Chicken or turkey with skin.  Dairy  Whole or 2% milk, cream, and half-and-half. Whole or full-fat cream cheese. Whole-fat or sweetened yogurt. Full-fat cheese. Nondairy creamers. Whipped toppings.  Processed cheese and cheese spreads.  Fats and oils  Butter. Stick margarine. Lard. Shortening. Ghee. Rosas fat. Tropical oils, such as coconut, palm kernel, or palm oil.  Seasoning and other foods  Salted popcorn and pretzels. Onion salt, garlic salt, seasoned salt, table salt, and sea salt. Worcestershire sauce. Tartar sauce. Barbecue sauce. Teriyaki sauce. Soy sauce, including reduced-sodium. Steak sauce. Canned and packaged gravies. Fish sauce. Oyster sauce. Cocktail sauce. Horseradish that you find on the shelf. Ketchup. Mustard. Meat flavorings and tenderizers. Bouillon cubes. Hot sauce and Tabasco sauce. Premade or packaged marinades. Premade or packaged taco seasonings. Relishes. Regular salad dressings.  Where to find more information:  · National Heart, Lung, and Blood [place]: www.nhlbi.nih.gov  · American Heart Association: www.heart.org  Summary  · The DASH eating plan is a healthy eating plan that has been shown to reduce high blood pressure (hypertension). It may also reduce your risk for type 2 diabetes, heart disease, and stroke.  · With the DASH eating plan, you should limit salt (sodium) intake to 2,300 mg a day. If you have hypertension, you may need to reduce your sodium intake to 1,500 mg a day.  · When on the DASH eating plan, aim to eat more fresh fruits and vegetables, whole grains, lean proteins, low-fat dairy, and heart-healthy fats.  · Work with your health care provider or diet and nutrition specialist (dietitian) to adjust your eating plan to your individual calorie needs.  This information is not intended to replace advice given to you by your health care provider. Make sure you discuss any questions you have with your health care provider.  Document Released: 12/06/2012 Document Revised: 12/11/2017 Document Reviewed: 12/11/2017  [employer] Patient Education © 2020 [employer]      Cont current meds,   Cont diet and exercise congratulated on weight loss.   Encouraged  smoking cessation.  Refer to cardiology will call with appt.   He will return for fasting labs, will call with results.   Pending labs will consider d/c glipizide/ changing metforming to XR if needed.   Increase fluid intake, get plenty of rest.   Patient agrees with plan of care and understands instructions. Call if worsening symptoms or any problems or concerns.

## 2020-02-24 ENCOUNTER — LAB (OUTPATIENT)
Dept: FAMILY MEDICINE CLINIC | Facility: CLINIC | Age: 50
End: 2020-02-24

## 2020-02-24 DIAGNOSIS — M54.2 NECK PAIN: ICD-10-CM

## 2020-02-24 DIAGNOSIS — R20.2 NUMBNESS AND TINGLING OF RIGHT ARM: ICD-10-CM

## 2020-02-24 DIAGNOSIS — Z72.0 TOBACCO ABUSE: ICD-10-CM

## 2020-02-24 DIAGNOSIS — R94.39 ABNORMAL STRESS TEST: ICD-10-CM

## 2020-02-24 DIAGNOSIS — R03.0 ELEVATED BP WITHOUT DIAGNOSIS OF HYPERTENSION: ICD-10-CM

## 2020-02-24 DIAGNOSIS — E78.5 HYPERLIPIDEMIA LDL GOAL <70: ICD-10-CM

## 2020-02-24 DIAGNOSIS — E11.65 TYPE 2 DIABETES MELLITUS WITH HYPERGLYCEMIA, WITHOUT LONG-TERM CURRENT USE OF INSULIN (HCC): ICD-10-CM

## 2020-02-24 DIAGNOSIS — R79.89 ABNORMAL CBC: ICD-10-CM

## 2020-02-24 DIAGNOSIS — R20.0 NUMBNESS AND TINGLING OF RIGHT ARM: ICD-10-CM

## 2020-02-24 DIAGNOSIS — I10 ESSENTIAL HYPERTENSION: ICD-10-CM

## 2020-02-24 DIAGNOSIS — I63.81 CEREBROVASCULAR ACCIDENT (CVA) DUE TO OCCLUSION OF SMALL ARTERY (HCC): ICD-10-CM

## 2020-02-24 DIAGNOSIS — Z76.89 ENCOUNTER TO ESTABLISH CARE: ICD-10-CM

## 2020-02-24 DIAGNOSIS — Z78.9 ELECTRONIC CIGARETTE USE: ICD-10-CM

## 2020-02-24 LAB
ALBUMIN SERPL-MCNC: 4.8 G/DL (ref 3.5–5.2)
ALBUMIN/GLOB SERPL: 1.7 G/DL
ALP SERPL-CCNC: 65 U/L (ref 39–117)
ALT SERPL W P-5'-P-CCNC: 17 U/L (ref 1–41)
ANION GAP SERPL CALCULATED.3IONS-SCNC: 11.8 MMOL/L (ref 5–15)
AST SERPL-CCNC: 15 U/L (ref 1–40)
BILIRUB SERPL-MCNC: 0.3 MG/DL (ref 0.2–1.2)
BUN BLD-MCNC: 19 MG/DL (ref 6–20)
BUN/CREAT SERPL: 20.9 (ref 7–25)
CALCIUM SPEC-SCNC: 9.9 MG/DL (ref 8.6–10.5)
CHLORIDE SERPL-SCNC: 102 MMOL/L (ref 98–107)
CHOLEST SERPL-MCNC: 90 MG/DL (ref 0–200)
CO2 SERPL-SCNC: 27.2 MMOL/L (ref 22–29)
CREAT BLD-MCNC: 0.91 MG/DL (ref 0.76–1.27)
ERYTHROCYTE [DISTWIDTH] IN BLOOD BY AUTOMATED COUNT: 13.2 % (ref 12.3–15.4)
GFR SERPL CREATININE-BSD FRML MDRD: 107 ML/MIN/1.73
GFR SERPL CREATININE-BSD FRML MDRD: 89 ML/MIN/1.73
GLOBULIN UR ELPH-MCNC: 2.8 GM/DL
GLUCOSE BLD-MCNC: 52 MG/DL (ref 65–99)
HBA1C MFR BLD: 5.1 % (ref 4.8–5.6)
HCT VFR BLD AUTO: 43.4 % (ref 37.5–51)
HDLC SERPL-MCNC: 35 MG/DL (ref 40–60)
HGB BLD-MCNC: 14.4 G/DL (ref 13–17.7)
LDLC SERPL CALC-MCNC: 45 MG/DL (ref 0–100)
LDLC/HDLC SERPL: 1.28 {RATIO}
LYMPHOCYTES # BLD AUTO: 2.4 10*3/MM3 (ref 0.7–3.1)
LYMPHOCYTES NFR BLD AUTO: 23.1 % (ref 19.6–45.3)
MCH RBC QN AUTO: 30.4 PG (ref 26.6–33)
MCHC RBC AUTO-ENTMCNC: 33.2 G/DL (ref 31.5–35.7)
MCV RBC AUTO: 91.6 FL (ref 79–97)
MONOCYTES # BLD AUTO: 0.6 10*3/MM3 (ref 0.1–0.9)
MONOCYTES NFR BLD AUTO: 5.2 % (ref 5–12)
NEUTROPHILS # BLD AUTO: 7.6 10*3/MM3 (ref 1.7–7)
NEUTROPHILS NFR BLD AUTO: 71.7 % (ref 42.7–76)
PLATELET # BLD AUTO: 324 10*3/MM3 (ref 140–450)
PMV BLD AUTO: 6.3 FL (ref 6–12)
POTASSIUM BLD-SCNC: 4.7 MMOL/L (ref 3.5–5.2)
PROT SERPL-MCNC: 7.6 G/DL (ref 6–8.5)
RBC # BLD AUTO: 4.74 10*6/MM3 (ref 4.14–5.8)
SODIUM BLD-SCNC: 141 MMOL/L (ref 136–145)
TRIGL SERPL-MCNC: 51 MG/DL (ref 0–150)
TSH SERPL DL<=0.05 MIU/L-ACNC: 1.17 UIU/ML (ref 0.27–4.2)
VLDLC SERPL-MCNC: 10.2 MG/DL (ref 5–40)
WBC NRBC COR # BLD: 10.6 10*3/MM3 (ref 3.4–10.8)

## 2020-02-24 PROCEDURE — 80053 COMPREHEN METABOLIC PANEL: CPT | Performed by: NURSE PRACTITIONER

## 2020-02-24 PROCEDURE — 36415 COLL VENOUS BLD VENIPUNCTURE: CPT | Performed by: NURSE PRACTITIONER

## 2020-02-24 PROCEDURE — 85025 COMPLETE CBC W/AUTO DIFF WBC: CPT | Performed by: NURSE PRACTITIONER

## 2020-02-24 PROCEDURE — 84443 ASSAY THYROID STIM HORMONE: CPT | Performed by: NURSE PRACTITIONER

## 2020-02-24 PROCEDURE — 80061 LIPID PANEL: CPT | Performed by: NURSE PRACTITIONER

## 2020-02-24 PROCEDURE — 83036 HEMOGLOBIN GLYCOSYLATED A1C: CPT | Performed by: NURSE PRACTITIONER

## 2020-02-26 ENCOUNTER — TELEPHONE (OUTPATIENT)
Dept: FAMILY MEDICINE CLINIC | Facility: CLINIC | Age: 50
End: 2020-02-26

## 2020-02-26 RX ORDER — METFORMIN HYDROCHLORIDE 500 MG/1
500 TABLET, EXTENDED RELEASE ORAL
Qty: 90 TABLET | Refills: 1 | Status: SHIPPED | OUTPATIENT
Start: 2020-02-26 | End: 2020-04-02 | Stop reason: SDUPTHER

## 2020-02-26 NOTE — TELEPHONE ENCOUNTER
Metformin xr sent to [facility], take 1 tablet daily in am. Results take about 72 hours to release on my chart then he can view.

## 2020-02-26 NOTE — TELEPHONE ENCOUNTER
Pt returned call on labs, asked if Lanie could [facility] all of [facility]? Right now, he can only see his CBC results.  Also mentioned Lanie saying possibly changing his metformin to an XR version, and since she lowered dose to once daily, he asked if she could just send the XR into [facility] for him?

## 2020-03-02 RX ORDER — LISINOPRIL 20 MG/1
20 TABLET ORAL DAILY
Qty: 90 TABLET | Refills: 1 | Status: SHIPPED | OUTPATIENT
Start: 2020-03-02 | End: 2020-04-02 | Stop reason: SDUPTHER

## 2020-03-02 RX ORDER — AMLODIPINE BESYLATE 5 MG/1
5 TABLET ORAL
Qty: 90 TABLET | Refills: 1 | Status: SHIPPED | OUTPATIENT
Start: 2020-03-02 | End: 2020-04-02 | Stop reason: SDUPTHER

## 2020-03-02 RX ORDER — ATORVASTATIN CALCIUM 80 MG/1
80 TABLET, FILM COATED ORAL NIGHTLY
Qty: 90 TABLET | Refills: 1 | Status: SHIPPED | OUTPATIENT
Start: 2020-03-02 | End: 2020-04-02 | Stop reason: SDUPTHER

## 2020-03-19 ENCOUNTER — TELEPHONE (OUTPATIENT)
Dept: CARDIOLOGY | Facility: CLINIC | Age: 50
End: 2020-03-19

## 2020-03-19 NOTE — TELEPHONE ENCOUNTER
I spoke to the pt regarding his appointment. He was not having any symptoms or concern at this time for . He agree to postpone his appointment for 3-6 month. He is aware that we will place him on a recall list and call him to schedule him an appointment in the future.    Thanks Lizet

## 2020-04-02 RX ORDER — LISINOPRIL 20 MG/1
20 TABLET ORAL DAILY
Qty: 90 TABLET | Refills: 1 | Status: SHIPPED | OUTPATIENT
Start: 2020-04-02 | End: 2020-05-04 | Stop reason: SDUPTHER

## 2020-04-02 RX ORDER — ATORVASTATIN CALCIUM 80 MG/1
80 TABLET, FILM COATED ORAL NIGHTLY
Qty: 90 TABLET | Refills: 1 | Status: SHIPPED | OUTPATIENT
Start: 2020-04-02 | End: 2020-05-04 | Stop reason: SDUPTHER

## 2020-04-02 RX ORDER — METFORMIN HYDROCHLORIDE 500 MG/1
500 TABLET, EXTENDED RELEASE ORAL
Qty: 90 TABLET | Refills: 1 | Status: SHIPPED | OUTPATIENT
Start: 2020-04-02 | End: 2020-05-04 | Stop reason: SDUPTHER

## 2020-04-02 RX ORDER — AMLODIPINE BESYLATE 5 MG/1
5 TABLET ORAL
Qty: 90 TABLET | Refills: 1 | Status: SHIPPED | OUTPATIENT
Start: 2020-04-02 | End: 2020-05-04 | Stop reason: SDUPTHER

## 2020-05-04 RX ORDER — AMLODIPINE BESYLATE 5 MG/1
5 TABLET ORAL
Qty: 90 TABLET | Refills: 1 | Status: SHIPPED | OUTPATIENT
Start: 2020-05-04 | End: 2020-06-05 | Stop reason: SDUPTHER

## 2020-05-04 RX ORDER — ATORVASTATIN CALCIUM 80 MG/1
80 TABLET, FILM COATED ORAL NIGHTLY
Qty: 90 TABLET | Refills: 1 | Status: SHIPPED | OUTPATIENT
Start: 2020-05-04 | End: 2020-06-05 | Stop reason: SDUPTHER

## 2020-05-04 RX ORDER — METFORMIN HYDROCHLORIDE 500 MG/1
500 TABLET, EXTENDED RELEASE ORAL
Qty: 90 TABLET | Refills: 1 | Status: SHIPPED | OUTPATIENT
Start: 2020-05-04 | End: 2020-06-05 | Stop reason: SDUPTHER

## 2020-05-04 RX ORDER — LISINOPRIL 20 MG/1
20 TABLET ORAL DAILY
Qty: 90 TABLET | Refills: 1 | Status: SHIPPED | OUTPATIENT
Start: 2020-05-04 | End: 2020-06-05 | Stop reason: SDUPTHER

## 2020-05-12 NOTE — PROGRESS NOTES
Subjective:     Encounter Date:08/07/2017      Patient ID: Roni Villaseñor is a 47 y.o. male.    Chief Complaint:  History of Present Illness    Comes into the office today for follow-up.  He was recently seen for preoperative assessment.  He has no prior cardiac history.  He is not been having chest pain or shortness of breath.  Preoperative echocardiogram was normal with no wall motion abnormality.  Lexiscan Cardiolite showed normal function but evidence of a prior small inferior base infarct.  Attenuation could not be excluded.  The stress test was felt to be a low risk study.  The patient then successfully went through his left elbow surgery.  He returns today for follow-up.    He denies any cardiac complaints.  He has been increasing his activity and working on his diet.This patient denies any chest pain, pressure, tightness, squeezing, or heartburn.  This patient has not experienced any feeling of palpitations, tachycardia or heart racing and no presyncope or syncope.  There has not been any problems with dizziness or lightheadedness.  There has not been any orthopnea or PND, and no problems with lower extremity edema.  This patient denies any shortness of breath at rest or with activity and has not had any wheezing.  This patient has not had any problems with unexplained nausea or vomiting. The patient has continued to perform daily activities of living without any specific problem or change in the level of activity.  This patient has not been recently hospitalized for any reason.      The following portions of the patient's history were reviewed and updated as appropriate: allergies, current medications, past family history, past medical history, past social history, past surgical history and problem list.    Past Medical History:   Diagnosis Date   • Abnormal blood sugar    • Abnormal CBC    • Abnormal EKG    • Abnormal nuclear stress test 06/28/2017    Previous inferior infarct; no ischemia noted   • Arm  bruise     scab on left knee   • TORRES (dyspnea on exertion)    • Fracture of elbow     left   • Heart murmur    • Malaise and fatigue    • Transient elevated blood pressure        Past Surgical History:   Procedure Laterality Date   • NO PAST SURGERIES     • TOTAL ELBOW ARTHROPLASTY Left 6/29/2017    Procedure: RADIAL HEAD ARTHROPLASTY AND LATERAL COLLATERAL LIGAMENT REPAIR;  Surgeon: Dmitry Davila MD;  Location: [facility] OR;  Service:        Social History     Social History   • Marital status: Single     Spouse name: N/A   • Number of children: N/A   • Years of education: N/A     Occupational History   • Not on file.     Social History Main Topics   • Smoking status: Current Every Day Smoker     Packs/day: 0.50     Years: 10.00     Types: Cigarettes, Electronic Cigarette   • Smokeless tobacco: Never Used      Comment: electronic cigarette nicotine 2 amps last 2 weeks   • Alcohol use Yes      Comment: rarely/  Daily caffeine use   • Drug use: Yes     Special: Marijuana      Comment: not regular basis   • Sexual activity: Defer     Other Topics Concern   • Not on file     Social History Narrative       Review of Systems   Constitution: Negative for chills, decreased appetite, fever and night sweats.   HENT: Negative for ear discharge, ear pain, hearing loss, nosebleeds and sore throat.    Eyes: Negative for blurred vision, double vision and pain.   Cardiovascular: Negative for cyanosis.   Respiratory: Negative for hemoptysis and sputum production.    Endocrine: Negative for cold intolerance and heat intolerance.   Hematologic/Lymphatic: Negative for adenopathy.   Skin: Negative for dry skin, itching, nail changes, rash and suspicious lesions.   Musculoskeletal: Positive for joint pain. Negative for arthritis, gout, muscle cramps, muscle weakness, myalgias and neck pain.   Gastrointestinal: Negative for anorexia, bowel incontinence, constipation, diarrhea, dysphagia, hematemesis and jaundice.   Genitourinary:  "Negative for bladder incontinence, dysuria, flank pain, frequency, hematuria and nocturia.   Neurological: Negative for focal weakness, numbness, paresthesias and seizures.   Psychiatric/Behavioral: Negative for altered mental status, hallucinations, hypervigilance, suicidal ideas and thoughts of violence.   Allergic/Immunologic: Negative for persistent infections.       Procedures       Objective:     Vitals:    08/07/17 1031   BP: 130/98   Pulse: 89   Weight: 204 lb (92.5 kg)   Height: 68\" (172.7 cm)         Physical Exam   Constitutional: He is oriented to person, place, and time. He appears well-developed and well-nourished. No distress.   HENT:   Head: Normocephalic and atraumatic.   Nose: Nose normal.   Mouth/Throat: Oropharynx is clear and moist.   Eyes: Conjunctivae and EOM are normal. Pupils are equal, round, and reactive to light. Right eye exhibits no discharge. Left eye exhibits no discharge.   Neck: Normal range of motion. Neck supple. No tracheal deviation present. No thyromegaly present.   Cardiovascular: Normal rate, regular rhythm, S1 normal, S2 normal, normal heart sounds and normal pulses.  Exam reveals no S3.    Pulmonary/Chest: Effort normal and breath sounds normal. No stridor. No respiratory distress. He exhibits no tenderness.   Abdominal: Soft. Bowel sounds are normal. He exhibits no distension and no mass. There is no tenderness. There is no rebound and no guarding.   Musculoskeletal: Normal range of motion. He exhibits no tenderness or deformity.   Lymphadenopathy:     He has no cervical adenopathy.   Neurological: He is alert and oriented to person, place, and time. He has normal reflexes.   Skin: Skin is warm and dry. No rash noted. He is not diaphoretic. No erythema.   Psychiatric: He has a normal mood and affect. Thought content normal.       Lab Review:             Performed        Assessment:          Diagnosis Plan   1. Abnormal nuclear stress test  Lipid Panel   2. Cigarette " nicotine dependence without complication     3. Electronic cigarette use            Plan:       This patient has a stress test that suggested potential old inferior myocardial infarction.  That was not corroborated by the echocardiogram.  The patient has absolute no symptoms.  His stress test was a low risk study.  We discussed options, at this point will focus on risk factor modification.  I recommended that he start taking 81 mg aspirin daily.  We discussed diet and exercise recommendations.  Smoking cessation is recommended.  He has never had his lipids checked that he is aware of; I placed an order for fasting lipids to be obtained.  Finally, his blood pressure is elevated but he still having a reasonable amount of pain with his recent left elbow surgery.  I've asked him to keep a watch on his blood pressure over the next couple of months and then give me a call.    Thank you very much for allowing us to participate in the care of this pleasant patient.  Please don't hesitate to call if I can be of assistance in any way.      Current Outpatient Prescriptions:   •  naproxen sodium (ALEVE) 220 MG tablet, Take 220 mg by mouth 2 (Two) Times a Day As Needed for Mild Pain (1-3)., Disp: , Rfl:   •  oxyCODONE-acetaminophen (PERCOCET) 7.5-325 MG per tablet, Take 1-2 tablets by mouth Every 4 (Four) Hours As Needed (Pain)., Disp: 60 tablet, Rfl: 0       no

## 2020-06-08 RX ORDER — AMLODIPINE BESYLATE 5 MG/1
5 TABLET ORAL
Qty: 90 TABLET | Refills: 1 | Status: SHIPPED | OUTPATIENT
Start: 2020-06-08 | End: 2020-07-07 | Stop reason: SDUPTHER

## 2020-06-08 RX ORDER — ATORVASTATIN CALCIUM 80 MG/1
80 TABLET, FILM COATED ORAL NIGHTLY
Qty: 90 TABLET | Refills: 1 | Status: SHIPPED | OUTPATIENT
Start: 2020-06-08 | End: 2020-07-07 | Stop reason: SDUPTHER

## 2020-06-08 RX ORDER — METFORMIN HYDROCHLORIDE 500 MG/1
500 TABLET, EXTENDED RELEASE ORAL
Qty: 90 TABLET | Refills: 1 | Status: SHIPPED | OUTPATIENT
Start: 2020-06-08 | End: 2020-07-07 | Stop reason: SDUPTHER

## 2020-06-08 RX ORDER — LISINOPRIL 20 MG/1
20 TABLET ORAL DAILY
Qty: 90 TABLET | Refills: 1 | Status: SHIPPED | OUTPATIENT
Start: 2020-06-08 | End: 2020-07-07 | Stop reason: SDUPTHER

## 2020-07-07 RX ORDER — METFORMIN HYDROCHLORIDE 500 MG/1
500 TABLET, EXTENDED RELEASE ORAL
Qty: 90 TABLET | Refills: 1 | Status: SHIPPED | OUTPATIENT
Start: 2020-07-07 | End: 2020-08-05 | Stop reason: SDUPTHER

## 2020-07-07 RX ORDER — ATORVASTATIN CALCIUM 80 MG/1
80 TABLET, FILM COATED ORAL NIGHTLY
Qty: 90 TABLET | Refills: 1 | Status: SHIPPED | OUTPATIENT
Start: 2020-07-07 | End: 2020-08-05 | Stop reason: SDUPTHER

## 2020-07-07 RX ORDER — LISINOPRIL 20 MG/1
20 TABLET ORAL DAILY
Qty: 90 TABLET | Refills: 1 | Status: SHIPPED | OUTPATIENT
Start: 2020-07-07 | End: 2020-08-05 | Stop reason: SDUPTHER

## 2020-07-07 RX ORDER — AMLODIPINE BESYLATE 5 MG/1
5 TABLET ORAL
Qty: 90 TABLET | Refills: 1 | Status: SHIPPED | OUTPATIENT
Start: 2020-07-07 | End: 2020-08-05 | Stop reason: SDUPTHER

## 2020-08-05 RX ORDER — ATORVASTATIN CALCIUM 80 MG/1
80 TABLET, FILM COATED ORAL NIGHTLY
Qty: 90 TABLET | Refills: 1 | Status: SHIPPED | OUTPATIENT
Start: 2020-08-05 | End: 2020-09-03 | Stop reason: SDUPTHER

## 2020-08-05 RX ORDER — METFORMIN HYDROCHLORIDE 500 MG/1
500 TABLET, EXTENDED RELEASE ORAL
Qty: 90 TABLET | Refills: 1 | Status: SHIPPED | OUTPATIENT
Start: 2020-08-05 | End: 2020-09-03 | Stop reason: SDUPTHER

## 2020-08-05 RX ORDER — AMLODIPINE BESYLATE 5 MG/1
5 TABLET ORAL
Qty: 90 TABLET | Refills: 1 | Status: SHIPPED | OUTPATIENT
Start: 2020-08-05 | End: 2020-09-03 | Stop reason: SDUPTHER

## 2020-08-05 RX ORDER — LISINOPRIL 20 MG/1
20 TABLET ORAL DAILY
Qty: 90 TABLET | Refills: 1 | Status: SHIPPED | OUTPATIENT
Start: 2020-08-05 | End: 2020-09-03 | Stop reason: SDUPTHER

## 2020-09-04 RX ORDER — AMLODIPINE BESYLATE 5 MG/1
5 TABLET ORAL
Qty: 90 TABLET | Refills: 1 | Status: SHIPPED | OUTPATIENT
Start: 2020-09-04 | End: 2020-12-06 | Stop reason: SDUPTHER

## 2020-09-04 RX ORDER — METFORMIN HYDROCHLORIDE 500 MG/1
500 TABLET, EXTENDED RELEASE ORAL
Qty: 90 TABLET | Refills: 1 | Status: SHIPPED | OUTPATIENT
Start: 2020-09-04 | End: 2020-12-06 | Stop reason: SDUPTHER

## 2020-09-04 RX ORDER — ATORVASTATIN CALCIUM 80 MG/1
80 TABLET, FILM COATED ORAL NIGHTLY
Qty: 90 TABLET | Refills: 1 | Status: SHIPPED | OUTPATIENT
Start: 2020-09-04 | End: 2020-12-06 | Stop reason: SDUPTHER

## 2020-09-04 RX ORDER — LISINOPRIL 20 MG/1
20 TABLET ORAL DAILY
Qty: 90 TABLET | Refills: 1 | Status: SHIPPED | OUTPATIENT
Start: 2020-09-04 | End: 2020-12-06 | Stop reason: SDUPTHER

## 2020-12-07 RX ORDER — LISINOPRIL 20 MG/1
20 TABLET ORAL DAILY
Qty: 90 TABLET | Refills: 1 | Status: SHIPPED | OUTPATIENT
Start: 2020-12-07 | End: 2021-03-14 | Stop reason: SDUPTHER

## 2020-12-07 RX ORDER — METFORMIN HYDROCHLORIDE 500 MG/1
500 TABLET, EXTENDED RELEASE ORAL
Qty: 90 TABLET | Refills: 1 | Status: SHIPPED | OUTPATIENT
Start: 2020-12-07 | End: 2021-03-14 | Stop reason: SDUPTHER

## 2020-12-07 RX ORDER — AMLODIPINE BESYLATE 5 MG/1
5 TABLET ORAL
Qty: 90 TABLET | Refills: 1 | Status: SHIPPED | OUTPATIENT
Start: 2020-12-07 | End: 2021-03-14 | Stop reason: SDUPTHER

## 2020-12-07 RX ORDER — ATORVASTATIN CALCIUM 80 MG/1
80 TABLET, FILM COATED ORAL NIGHTLY
Qty: 90 TABLET | Refills: 1 | Status: SHIPPED | OUTPATIENT
Start: 2020-12-07 | End: 2021-03-14 | Stop reason: SDUPTHER

## 2021-03-15 RX ORDER — AMLODIPINE BESYLATE 5 MG/1
5 TABLET ORAL
Qty: 90 TABLET | Refills: 0 | Status: SHIPPED | OUTPATIENT
Start: 2021-03-15 | End: 2021-06-14 | Stop reason: SDUPTHER

## 2021-03-15 RX ORDER — METFORMIN HYDROCHLORIDE 500 MG/1
500 TABLET, EXTENDED RELEASE ORAL
Qty: 90 TABLET | Refills: 0 | Status: SHIPPED | OUTPATIENT
Start: 2021-03-15 | End: 2021-06-14 | Stop reason: SDUPTHER

## 2021-03-15 RX ORDER — LISINOPRIL 20 MG/1
20 TABLET ORAL DAILY
Qty: 90 TABLET | Refills: 0 | Status: SHIPPED | OUTPATIENT
Start: 2021-03-15 | End: 2021-06-14 | Stop reason: SDUPTHER

## 2021-03-15 RX ORDER — ATORVASTATIN CALCIUM 80 MG/1
80 TABLET, FILM COATED ORAL NIGHTLY
Qty: 90 TABLET | Refills: 0 | Status: SHIPPED | OUTPATIENT
Start: 2021-03-15 | End: 2021-06-14 | Stop reason: SDUPTHER

## 2021-03-24 ENCOUNTER — BULK ORDERING (OUTPATIENT)
Dept: CASE MANAGEMENT | Facility: OTHER | Age: 51
End: 2021-03-24

## 2021-03-24 DIAGNOSIS — Z23 IMMUNIZATION DUE: ICD-10-CM

## 2021-06-14 RX ORDER — LISINOPRIL 20 MG/1
20 TABLET ORAL DAILY
Qty: 30 TABLET | Refills: 0 | Status: SHIPPED | OUTPATIENT
Start: 2021-06-14 | End: 2021-07-18 | Stop reason: SDUPTHER

## 2021-06-14 RX ORDER — METFORMIN HYDROCHLORIDE 500 MG/1
500 TABLET, EXTENDED RELEASE ORAL
Qty: 30 TABLET | Refills: 0 | Status: SHIPPED | OUTPATIENT
Start: 2021-06-14 | End: 2021-07-18 | Stop reason: SDUPTHER

## 2021-06-14 RX ORDER — ATORVASTATIN CALCIUM 80 MG/1
80 TABLET, FILM COATED ORAL NIGHTLY
Qty: 30 TABLET | Refills: 0 | Status: SHIPPED | OUTPATIENT
Start: 2021-06-14 | End: 2021-07-18 | Stop reason: SDUPTHER

## 2021-06-14 RX ORDER — AMLODIPINE BESYLATE 5 MG/1
5 TABLET ORAL
Qty: 30 TABLET | Refills: 0 | Status: SHIPPED | OUTPATIENT
Start: 2021-06-14 | End: 2021-07-18 | Stop reason: SDUPTHER

## 2021-06-24 ENCOUNTER — OFFICE VISIT (OUTPATIENT)
Dept: FAMILY MEDICINE CLINIC | Facility: CLINIC | Age: 51
End: 2021-06-24

## 2021-06-24 VITALS
TEMPERATURE: 98.9 F | HEIGHT: 68 IN | DIASTOLIC BLOOD PRESSURE: 70 MMHG | HEART RATE: 86 BPM | BODY MASS INDEX: 30.52 KG/M2 | WEIGHT: 201.4 LBS | OXYGEN SATURATION: 98 % | SYSTOLIC BLOOD PRESSURE: 136 MMHG

## 2021-06-24 DIAGNOSIS — E78.5 HYPERLIPIDEMIA LDL GOAL <70: ICD-10-CM

## 2021-06-24 DIAGNOSIS — Z12.11 ENCOUNTER FOR SCREENING FOR MALIGNANT NEOPLASM OF COLON: ICD-10-CM

## 2021-06-24 DIAGNOSIS — I63.81 CEREBROVASCULAR ACCIDENT (CVA) DUE TO OCCLUSION OF SMALL ARTERY (HCC): ICD-10-CM

## 2021-06-24 DIAGNOSIS — E11.9 TYPE 2 DIABETES MELLITUS WITHOUT COMPLICATION, WITHOUT LONG-TERM CURRENT USE OF INSULIN (HCC): ICD-10-CM

## 2021-06-24 DIAGNOSIS — Z12.5 PROSTATE CANCER SCREENING: ICD-10-CM

## 2021-06-24 DIAGNOSIS — I10 ESSENTIAL HYPERTENSION: Primary | ICD-10-CM

## 2021-06-24 LAB
ALBUMIN SERPL-MCNC: 4.9 G/DL (ref 3.5–5.2)
ALBUMIN/GLOB SERPL: 1.8 G/DL
ALP SERPL-CCNC: 67 U/L (ref 39–117)
ALT SERPL W P-5'-P-CCNC: 23 U/L (ref 1–41)
ANION GAP SERPL CALCULATED.3IONS-SCNC: 9.8 MMOL/L (ref 5–15)
AST SERPL-CCNC: 13 U/L (ref 1–40)
BILIRUB SERPL-MCNC: 0.6 MG/DL (ref 0–1.2)
BUN SERPL-MCNC: 14 MG/DL (ref 6–20)
BUN/CREAT SERPL: 14.1 (ref 7–25)
CALCIUM SPEC-SCNC: 9.8 MG/DL (ref 8.6–10.5)
CHLORIDE SERPL-SCNC: 102 MMOL/L (ref 98–107)
CHOLEST SERPL-MCNC: 93 MG/DL (ref 0–200)
CO2 SERPL-SCNC: 29.2 MMOL/L (ref 22–29)
CREAT SERPL-MCNC: 0.99 MG/DL (ref 0.76–1.27)
ERYTHROCYTE [DISTWIDTH] IN BLOOD BY AUTOMATED COUNT: 12.8 % (ref 12.3–15.4)
GFR SERPL CREATININE-BSD FRML MDRD: 80 ML/MIN/1.73
GFR SERPL CREATININE-BSD FRML MDRD: 97 ML/MIN/1.73
GLOBULIN UR ELPH-MCNC: 2.8 GM/DL
GLUCOSE SERPL-MCNC: 114 MG/DL (ref 65–99)
HBA1C MFR BLD: 5.7 % (ref 4.8–5.6)
HCT VFR BLD AUTO: 59.1 % (ref 37.5–51)
HDLC SERPL-MCNC: 34 MG/DL (ref 40–60)
HGB BLD-MCNC: 16.6 G/DL (ref 13–17.7)
LDLC SERPL CALC-MCNC: 42 MG/DL (ref 0–100)
LDLC/HDLC SERPL: 1.26 {RATIO}
LYMPHOCYTES # BLD AUTO: 2.7 10*3/MM3 (ref 0.7–3.1)
LYMPHOCYTES NFR BLD AUTO: 28 % (ref 19.6–45.3)
MCH RBC QN AUTO: 25.5 PG (ref 26.6–33)
MCHC RBC AUTO-ENTMCNC: 28.2 G/DL (ref 31.5–35.7)
MCV RBC AUTO: 90.4 FL (ref 79–97)
MONOCYTES # BLD AUTO: 0.7 10*3/MM3 (ref 0.1–0.9)
MONOCYTES NFR BLD AUTO: 7.4 % (ref 5–12)
NEUTROPHILS NFR BLD AUTO: 6.3 10*3/MM3 (ref 1.7–7)
NEUTROPHILS NFR BLD AUTO: 64.6 % (ref 42.7–76)
PLATELET # BLD AUTO: 352 10*3/MM3 (ref 140–450)
PMV BLD AUTO: 6.5 FL (ref 6–12)
POTASSIUM SERPL-SCNC: 4.4 MMOL/L (ref 3.5–5.2)
PROT SERPL-MCNC: 7.7 G/DL (ref 6–8.5)
PSA SERPL-MCNC: 0.87 NG/ML (ref 0–4)
RBC # BLD AUTO: 6.54 10*6/MM3 (ref 4.14–5.8)
SODIUM SERPL-SCNC: 141 MMOL/L (ref 136–145)
TRIGL SERPL-MCNC: 81 MG/DL (ref 0–150)
TSH SERPL DL<=0.05 MIU/L-ACNC: 1.37 UIU/ML (ref 0.27–4.2)
VLDLC SERPL-MCNC: 17 MG/DL (ref 5–40)
WBC # BLD AUTO: 9.8 10*3/MM3 (ref 3.4–10.8)

## 2021-06-24 PROCEDURE — 85025 COMPLETE CBC W/AUTO DIFF WBC: CPT | Performed by: NURSE PRACTITIONER

## 2021-06-24 PROCEDURE — 84443 ASSAY THYROID STIM HORMONE: CPT | Performed by: NURSE PRACTITIONER

## 2021-06-24 PROCEDURE — 83036 HEMOGLOBIN GLYCOSYLATED A1C: CPT | Performed by: NURSE PRACTITIONER

## 2021-06-24 PROCEDURE — 80061 LIPID PANEL: CPT | Performed by: NURSE PRACTITIONER

## 2021-06-24 PROCEDURE — 80053 COMPREHEN METABOLIC PANEL: CPT | Performed by: NURSE PRACTITIONER

## 2021-06-24 PROCEDURE — G0103 PSA SCREENING: HCPCS | Performed by: NURSE PRACTITIONER

## 2021-06-24 PROCEDURE — 99214 OFFICE O/P EST MOD 30 MIN: CPT | Performed by: NURSE PRACTITIONER

## 2021-06-24 NOTE — PROGRESS NOTES
"Chief Complaint  Follow-up (does not want physical), Diabetes, Hypertension, and Hyperlipidemia    Subjective          Roni Villaseñor presents to [facility]  History of Present Illness  Here today for f/u, with HTN, taking amlodipine 5mg daily, lisinopril 20mg daily, he does not check BP at home often, denies CP, SOA, HA, LE edema. He did take meds today. He is no longer smoking, does use vaporizer.   With HLD, taking lipitor 80mg daily, hx of CVA, also taking asa daily. Tolerating well. He is fasting today.   With DM, taking metformin 500mg daily, last a1c 2/2020 5.10. states he has relaxed on his diet recently, not exercise as much as he was. Does check BS at home, usually 100s. He is working from home. Due for eye exam.   He has never had colonoscopy. He rodriguez not see urology.         Objective   Vital Signs:   /70   Pulse 86   Temp 98.9 °F (37.2 °C)   Ht 172.7 cm (68\")   Wt 91.4 kg (201 lb 6.4 oz)   SpO2 98%   BMI 30.62 kg/m²     Physical Exam  Vitals and nursing note reviewed.   Constitutional:       Appearance: He is well-developed.   HENT:      Head: Normocephalic.   Eyes:      Pupils: Pupils are equal, round, and reactive to light.   Cardiovascular:      Rate and Rhythm: Normal rate and regular rhythm.      Pulses: Normal pulses.      Heart sounds: Normal heart sounds.   Pulmonary:      Effort: Pulmonary effort is normal.      Breath sounds: Normal breath sounds.   Skin:     General: Skin is warm and dry.   Neurological:      Mental Status: He is alert and oriented to person, place, and time.   Psychiatric:         Behavior: Behavior normal.         Judgment: Judgment normal.        Result Review :                 Assessment and Plan    Diagnoses and all orders for this visit:    1. Essential hypertension (Primary)  -     CBC & Differential  -     Comprehensive Metabolic Panel  -     Lipid Panel  -     TSH  -     Hemoglobin A1c  -     Cancel: MicroAlbumin, Urine, Random - " Urine, Clean Catch  -     PSA Screen    2. Hyperlipidemia LDL goal <70  -     CBC & Differential  -     Comprehensive Metabolic Panel  -     Lipid Panel  -     TSH  -     Hemoglobin A1c  -     Cancel: MicroAlbumin, Urine, Random - Urine, Clean Catch  -     PSA Screen    3. Cerebrovascular accident (CVA) due to occlusion of small artery (CMS/HCC)  -     CBC & Differential  -     Comprehensive Metabolic Panel  -     Lipid Panel  -     TSH  -     Hemoglobin A1c  -     Cancel: MicroAlbumin, Urine, Random - Urine, Clean Catch  -     PSA Screen    4. Type 2 diabetes mellitus without complication, without long-term current use of insulin (CMS/[facility])  -     CBC & Differential  -     Comprehensive Metabolic Panel  -     Lipid Panel  -     TSH  -     Hemoglobin A1c  -     Cancel: MicroAlbumin, Urine, Random - Urine, Clean Catch  -     PSA Screen    5. Prostate cancer screening  -     CBC & Differential  -     Comprehensive Metabolic Panel  -     Lipid Panel  -     TSH  -     Hemoglobin A1c  -     Cancel: MicroAlbumin, Urine, Random - Urine, Clean Catch  -     PSA Screen    6. Encounter for screening for malignant neoplasm of colon  -     CBC & Differential  -     Comprehensive Metabolic Panel  -     Lipid Panel  -     TSH  -     Hemoglobin A1c  -     Cancel: MicroAlbumin, Urine, Random - Urine, Clean Catch  -     PSA Screen  -     Ambulatory Referral For Screening Colonoscopy        Follow Up   Return in about 6 months (around 12/24/2021), or if symptoms worsen or fail to improve, for Recheck.  Patient was given instructions and counseling regarding his condition or for health maintenance advice. Please see specific information pulled into the AVS if appropriate.     Labs today will call with results.   Cont diet and exercise,   Cont to monitor BP and BS at home,   Deferred aston today,  Refer for screening colonoscopy,   Patient agrees with plan of care and understands instructions. Call if worsening symptoms or any problems or  concerns.

## 2021-06-24 NOTE — PATIENT INSTRUCTIONS
Labs today will call with results.   Cont diet and exercise,   Cont to monitor BP and BS at home,   Deferred aston today,  Refer for screening colonoscopy,   Patient agrees with plan of care and understands instructions. Call if worsening symptoms or any problems or concerns.

## 2021-07-19 RX ORDER — AMLODIPINE BESYLATE 5 MG/1
5 TABLET ORAL
Qty: 30 TABLET | Refills: 0 | Status: SHIPPED | OUTPATIENT
Start: 2021-07-19 | End: 2021-08-16 | Stop reason: SDUPTHER

## 2021-07-19 RX ORDER — LISINOPRIL 20 MG/1
20 TABLET ORAL DAILY
Qty: 30 TABLET | Refills: 0 | Status: SHIPPED | OUTPATIENT
Start: 2021-07-19 | End: 2021-08-16 | Stop reason: SDUPTHER

## 2021-07-19 RX ORDER — METFORMIN HYDROCHLORIDE 500 MG/1
500 TABLET, EXTENDED RELEASE ORAL
Qty: 30 TABLET | Refills: 0 | Status: SHIPPED | OUTPATIENT
Start: 2021-07-19 | End: 2021-08-16 | Stop reason: SDUPTHER

## 2021-07-19 RX ORDER — ATORVASTATIN CALCIUM 80 MG/1
80 TABLET, FILM COATED ORAL NIGHTLY
Qty: 30 TABLET | Refills: 0 | Status: SHIPPED | OUTPATIENT
Start: 2021-07-19 | End: 2021-08-16 | Stop reason: SDUPTHER

## 2021-08-16 RX ORDER — AMLODIPINE BESYLATE 5 MG/1
5 TABLET ORAL
Qty: 30 TABLET | Refills: 0 | Status: SHIPPED | OUTPATIENT
Start: 2021-08-16

## 2021-08-16 RX ORDER — ATORVASTATIN CALCIUM 80 MG/1
80 TABLET, FILM COATED ORAL NIGHTLY
Qty: 30 TABLET | Refills: 0 | Status: SHIPPED | OUTPATIENT
Start: 2021-08-16

## 2021-08-16 RX ORDER — METFORMIN HYDROCHLORIDE 500 MG/1
500 TABLET, EXTENDED RELEASE ORAL
Qty: 30 TABLET | Refills: 0 | Status: SHIPPED | OUTPATIENT
Start: 2021-08-16

## 2021-08-16 RX ORDER — LISINOPRIL 20 MG/1
20 TABLET ORAL DAILY
Qty: 30 TABLET | Refills: 0 | Status: SHIPPED | OUTPATIENT
Start: 2021-08-16

## (undated) DEVICE — SPNG GZ WOVN 4X4IN 12PLY 10/BX STRL

## (undated) DEVICE — 3M™ STERI-DRAPE™ INSTRUMENT POUCH 1018: Brand: STERI-DRAPE™

## (undated) DEVICE — APPL CHLORAPREP W/TINT 26ML ORNG

## (undated) DEVICE — DRAPE,U/ SHT,SPLIT,PLAS,STERIL: Brand: MEDLINE

## (undated) DEVICE — DRP C/ARM 41X74IN

## (undated) DEVICE — BNDG ESMARK 4IN 12FT LF STRL BLU

## (undated) DEVICE — HEWSON SUTURE RETRIEVER: Brand: HEWSON SUTURE RETRIEVER

## (undated) DEVICE — PK ORTHO MAJ 40

## (undated) DEVICE — GLV SURG BIOGEL LTX PF 8 1/2

## (undated) DEVICE — HANDPIECE SET WITH COAXIAL HIGH FLOW TIP AND SUCTION TUBE: Brand: INTERPULSE

## (undated) DEVICE — 3M™ IOBAN™ 2 ANTIMICROBIAL INCISE DRAPE 6650EZ: Brand: IOBAN™ 2

## (undated) DEVICE — ADHS LIQ MASTISOL 2/3ML

## (undated) DEVICE — STPLR SKIN VISISTAT WD 35CT

## (undated) DEVICE — SKIN PREP TRAY W/CHG: Brand: MEDLINE INDUSTRIES, INC.

## (undated) DEVICE — SOL ISO/ALC RUB 70PCT 4OZ

## (undated) DEVICE — MAT FLR ABSORBENT LG 4FT 10 2.5FT

## (undated) DEVICE — CONN TBG Y 5 IN 1 LF STRL

## (undated) DEVICE — TUBING, SUCTION, 1/4" X 20', STRAIGHT: Brand: MEDLINE INDUSTRIES, INC.

## (undated) DEVICE — PRECISION THIN (9.0 X 0.38 X 25.0MM)

## (undated) DEVICE — SUT VIC 1 CT 36IN J959H

## (undated) DEVICE — SUT MNCRYL 0 CT2 CR8 18IN D8893

## (undated) DEVICE — T-DRAPE,EXTREMITY,STERILE: Brand: MEDLINE

## (undated) DEVICE — SUT VIC 3/0 CTI 36IN J944H

## (undated) DEVICE — VESSEL LOOPS X-RAY DETECTABLE: Brand: DEROYAL

## (undated) DEVICE — THIN OFFSET (13.0 X 0.38 X 39.0MM)

## (undated) DEVICE — TOWEL,OR,DSP,ST,BLUE,STD,4/PK,20PK/CS: Brand: MEDLINE

## (undated) DEVICE — GLV SURG TRIUMPH CLASSIC PF LTX 8.5 STRL

## (undated) DEVICE — CULT AER/ANAEROB FASTIDIOUS BACT

## (undated) DEVICE — 3M™ STERI-STRIP™ REINFORCED ADHESIVE SKIN CLOSURES, R1547, 1/2 IN X 4 IN (12 MM X 100 MM), 6 STRIPS/ENVELOPE: Brand: 3M™ STERI-STRIP™

## (undated) DEVICE — 3M™ STERI-DRAPE™ U-DRAPE 1015: Brand: STERI-DRAPE™

## (undated) DEVICE — 3M™ IOBAN™ 2 ANTIMICROBIAL INCISE DRAPE 6640EZ: Brand: IOBAN™ 2